# Patient Record
Sex: MALE | Race: WHITE | NOT HISPANIC OR LATINO | Employment: OTHER | URBAN - NONMETROPOLITAN AREA
[De-identification: names, ages, dates, MRNs, and addresses within clinical notes are randomized per-mention and may not be internally consistent; named-entity substitution may affect disease eponyms.]

---

## 2021-07-24 ENCOUNTER — HOSPITAL ENCOUNTER (OUTPATIENT)
Facility: HOSPITAL | Age: 86
Discharge: HOME | End: 2021-08-07
Attending: INTERNAL MEDICINE | Admitting: INTERNAL MEDICINE

## 2021-07-24 RX ORDER — BISACODYL 10 MG
10 SUPPOSITORY, RECTAL RECTAL NIGHTLY PRN
Status: DISCONTINUED | OUTPATIENT
Start: 2021-07-24 | End: 2021-08-07 | Stop reason: HOSPADM

## 2021-07-24 RX ORDER — ONDANSETRON 2 MG/ML
4 INJECTION INTRAMUSCULAR; INTRAVENOUS EVERY 6 HOURS PRN
Status: DISCONTINUED | OUTPATIENT
Start: 2021-07-24 | End: 2021-08-07 | Stop reason: HOSPADM

## 2021-07-24 RX ORDER — ACETAMINOPHEN 325 MG/1
650 TABLET ORAL EVERY 6 HOURS PRN
Status: DISCONTINUED | OUTPATIENT
Start: 2021-07-24 | End: 2021-08-07 | Stop reason: HOSPADM

## 2021-07-24 RX ORDER — SACCHAROMYCES BOULARDII 250 MG
500 CAPSULE ORAL 2 TIMES DAILY
Status: DISCONTINUED | OUTPATIENT
Start: 2021-07-25 | End: 2021-08-07 | Stop reason: HOSPADM

## 2021-07-24 RX ORDER — FUROSEMIDE 10 MG/ML
40 INJECTION INTRAMUSCULAR; INTRAVENOUS EVERY 8 HOURS SCHEDULED
Status: DISCONTINUED | OUTPATIENT
Start: 2021-07-25 | End: 2021-07-25

## 2021-07-24 RX ORDER — ALLOPURINOL 100 MG/1
100 TABLET ORAL DAILY
Status: DISCONTINUED | OUTPATIENT
Start: 2021-07-25 | End: 2021-08-07 | Stop reason: HOSPADM

## 2021-07-24 RX ORDER — SPIRONOLACTONE 25 MG/1
25 TABLET ORAL DAILY
Status: DISCONTINUED | OUTPATIENT
Start: 2021-07-25 | End: 2021-08-06

## 2021-07-24 RX ORDER — HYDROCODONE BITARTRATE AND ACETAMINOPHEN 7.5; 325 MG/1; MG/1
1 TABLET ORAL NIGHTLY PRN
Status: DISCONTINUED | OUTPATIENT
Start: 2021-07-24 | End: 2021-07-25

## 2021-07-24 RX ORDER — FINASTERIDE 5 MG/1
5 TABLET, FILM COATED ORAL DAILY
Status: DISCONTINUED | OUTPATIENT
Start: 2021-07-25 | End: 2021-08-07 | Stop reason: HOSPADM

## 2021-07-24 RX ORDER — ALUMINA, MAGNESIA, AND SIMETHICONE 2400; 2400; 240 MG/30ML; MG/30ML; MG/30ML
30 SUSPENSION ORAL EVERY 4 HOURS PRN
Status: DISCONTINUED | OUTPATIENT
Start: 2021-07-24 | End: 2021-08-07 | Stop reason: HOSPADM

## 2021-07-24 RX ORDER — ATORVASTATIN CALCIUM 10 MG/1
10 TABLET, FILM COATED ORAL NIGHTLY
Status: DISCONTINUED | OUTPATIENT
Start: 2021-07-25 | End: 2021-08-07 | Stop reason: HOSPADM

## 2021-07-24 RX ORDER — CARVEDILOL 3.12 MG/1
12.5 TABLET ORAL 2 TIMES DAILY WITH MEALS
Status: DISCONTINUED | OUTPATIENT
Start: 2021-07-25 | End: 2021-08-07 | Stop reason: HOSPADM

## 2021-07-24 RX ORDER — ASPIRIN 81 MG/1
81 TABLET, CHEWABLE ORAL DAILY
Status: DISCONTINUED | OUTPATIENT
Start: 2021-07-25 | End: 2021-08-07 | Stop reason: HOSPADM

## 2021-07-25 LAB
ALBUMIN SERPL-MCNC: 3.2 G/DL (ref 3.5–5.2)
ALBUMIN/GLOB SERPL: 1.3 G/DL
ALP SERPL-CCNC: 87 U/L (ref 39–117)
ALT SERPL W P-5'-P-CCNC: 27 U/L (ref 1–41)
ANION GAP SERPL CALCULATED.3IONS-SCNC: 11 MMOL/L (ref 5–15)
ANISOCYTOSIS BLD QL: ABNORMAL
AST SERPL-CCNC: 31 U/L (ref 1–40)
BACTERIA UR QL AUTO: ABNORMAL /HPF
BASOPHILS # BLD MANUAL: 0.27 10*3/MM3 (ref 0–0.2)
BASOPHILS NFR BLD AUTO: 2 % (ref 0–1.5)
BILIRUB SERPL-MCNC: 0.5 MG/DL (ref 0–1.2)
BILIRUB UR QL STRIP: NEGATIVE
BUN SERPL-MCNC: 51 MG/DL (ref 8–23)
BUN/CREAT SERPL: 32.5 (ref 7–25)
CALCIUM SPEC-SCNC: 8.8 MG/DL (ref 8.2–9.6)
CHLORIDE SERPL-SCNC: 100 MMOL/L (ref 98–107)
CLARITY UR: CLEAR
CLUMPED PLATELETS: PRESENT
CO2 SERPL-SCNC: 27 MMOL/L (ref 22–29)
COLOR UR: YELLOW
CREAT SERPL-MCNC: 1.57 MG/DL (ref 0.76–1.27)
CREAT UR-MCNC: 33.2 MG/DL
DEPRECATED RDW RBC AUTO: 53.1 FL (ref 37–54)
EOSINOPHIL # BLD MANUAL: 0.54 10*3/MM3 (ref 0–0.4)
EOSINOPHIL NFR BLD MANUAL: 4.1 % (ref 0.3–6.2)
ERYTHROCYTE [DISTWIDTH] IN BLOOD BY AUTOMATED COUNT: 15.4 % (ref 12.3–15.4)
GFR SERPL CREATININE-BSD FRML MDRD: 42 ML/MIN/1.73
GLOBULIN UR ELPH-MCNC: 2.5 GM/DL
GLUCOSE SERPL-MCNC: 139 MG/DL (ref 65–99)
GLUCOSE UR STRIP-MCNC: NEGATIVE MG/DL
HCT VFR BLD AUTO: 34.1 % (ref 37.5–51)
HGB BLD-MCNC: 11.5 G/DL (ref 13–17.7)
HGB UR QL STRIP.AUTO: ABNORMAL
HYALINE CASTS UR QL AUTO: ABNORMAL /LPF
KETONES UR QL STRIP: NEGATIVE
LEUKOCYTE ESTERASE UR QL STRIP.AUTO: ABNORMAL
LYMPHOCYTES # BLD MANUAL: 2.03 10*3/MM3 (ref 0.7–3.1)
LYMPHOCYTES NFR BLD MANUAL: 15.3 % (ref 19.6–45.3)
LYMPHOCYTES NFR BLD MANUAL: 9.2 % (ref 5–12)
MACROCYTES BLD QL SMEAR: ABNORMAL
MCH RBC QN AUTO: 31.9 PG (ref 26.6–33)
MCHC RBC AUTO-ENTMCNC: 33.7 G/DL (ref 31.5–35.7)
MCV RBC AUTO: 94.5 FL (ref 79–97)
MONOCYTES # BLD AUTO: 1.22 10*3/MM3 (ref 0.1–0.9)
MYELOCYTES NFR BLD MANUAL: 2 % (ref 0–0)
NEUTROPHILS # BLD AUTO: 8.92 10*3/MM3 (ref 1.7–7)
NEUTROPHILS NFR BLD MANUAL: 66.3 % (ref 42.7–76)
NEUTS BAND NFR BLD MANUAL: 1 % (ref 0–5)
NITRITE UR QL STRIP: NEGATIVE
PH UR STRIP.AUTO: 6 [PH] (ref 5–8)
PLATELET # BLD AUTO: 277 10*3/MM3 (ref 140–450)
PMV BLD AUTO: 9.3 FL (ref 6–12)
POLYCHROMASIA BLD QL SMEAR: ABNORMAL
POTASSIUM SERPL-SCNC: 4.6 MMOL/L (ref 3.5–5.2)
PREALB SERPL-MCNC: 18 MG/DL (ref 20–40)
PROT SERPL-MCNC: 5.7 G/DL (ref 6–8.5)
PROT UR QL STRIP: ABNORMAL
RBC # BLD AUTO: 3.61 10*6/MM3 (ref 4.14–5.8)
RBC # UR: ABNORMAL /HPF
REF LAB TEST METHOD: ABNORMAL
SODIUM SERPL-SCNC: 138 MMOL/L (ref 136–145)
SODIUM UR-SCNC: 74 MMOL/L
SP GR UR STRIP: 1.01 (ref 1–1.03)
SQUAMOUS #/AREA URNS HPF: ABNORMAL /HPF
UROBILINOGEN UR QL STRIP: ABNORMAL
WBC # BLD AUTO: 13.25 10*3/MM3 (ref 3.4–10.8)
WBC MORPH BLD: NORMAL
WBC UR QL AUTO: ABNORMAL /HPF

## 2021-07-25 PROCEDURE — 80053 COMPREHEN METABOLIC PANEL: CPT | Performed by: INTERNAL MEDICINE

## 2021-07-25 PROCEDURE — 25010000002 FUROSEMIDE PER 20 MG: Performed by: INTERNAL MEDICINE

## 2021-07-25 PROCEDURE — 82570 ASSAY OF URINE CREATININE: CPT | Performed by: INTERNAL MEDICINE

## 2021-07-25 PROCEDURE — 87086 URINE CULTURE/COLONY COUNT: CPT | Performed by: INTERNAL MEDICINE

## 2021-07-25 PROCEDURE — 85007 BL SMEAR W/DIFF WBC COUNT: CPT | Performed by: INTERNAL MEDICINE

## 2021-07-25 PROCEDURE — 81001 URINALYSIS AUTO W/SCOPE: CPT | Performed by: INTERNAL MEDICINE

## 2021-07-25 PROCEDURE — 85025 COMPLETE CBC W/AUTO DIFF WBC: CPT | Performed by: INTERNAL MEDICINE

## 2021-07-25 PROCEDURE — 92610 EVALUATE SWALLOWING FUNCTION: CPT

## 2021-07-25 PROCEDURE — 84134 ASSAY OF PREALBUMIN: CPT | Performed by: INTERNAL MEDICINE

## 2021-07-25 PROCEDURE — 84300 ASSAY OF URINE SODIUM: CPT | Performed by: INTERNAL MEDICINE

## 2021-07-25 PROCEDURE — 25010000002 ENOXAPARIN PER 10 MG: Performed by: NURSE PRACTITIONER

## 2021-07-25 RX ORDER — HYDROCODONE BITARTRATE AND ACETAMINOPHEN 7.5; 325 MG/1; MG/1
1 TABLET ORAL EVERY 4 HOURS PRN
Status: DISCONTINUED | OUTPATIENT
Start: 2021-07-25 | End: 2021-08-07 | Stop reason: HOSPADM

## 2021-07-26 LAB
ALBUMIN SERPL-MCNC: 3.3 G/DL (ref 3.5–5.2)
ALBUMIN/GLOB SERPL: 1 G/DL
ALP SERPL-CCNC: 88 U/L (ref 39–117)
ALT SERPL W P-5'-P-CCNC: 31 U/L (ref 1–41)
ANION GAP SERPL CALCULATED.3IONS-SCNC: 14 MMOL/L (ref 5–15)
AST SERPL-CCNC: 45 U/L (ref 1–40)
BASOPHILS # BLD AUTO: 0.11 10*3/MM3 (ref 0–0.2)
BASOPHILS NFR BLD AUTO: 0.9 % (ref 0–1.5)
BILIRUB SERPL-MCNC: 0.4 MG/DL (ref 0–1.2)
BUN SERPL-MCNC: 49 MG/DL (ref 8–23)
BUN/CREAT SERPL: 27.4 (ref 7–25)
CALCIUM SPEC-SCNC: 9.1 MG/DL (ref 8.2–9.6)
CHLORIDE SERPL-SCNC: 97 MMOL/L (ref 98–107)
CO2 SERPL-SCNC: 27 MMOL/L (ref 22–29)
CREAT SERPL-MCNC: 1.79 MG/DL (ref 0.76–1.27)
DEPRECATED RDW RBC AUTO: 54.6 FL (ref 37–54)
EOSINOPHIL # BLD AUTO: 0.43 10*3/MM3 (ref 0–0.4)
EOSINOPHIL NFR BLD AUTO: 3.7 % (ref 0.3–6.2)
ERYTHROCYTE [DISTWIDTH] IN BLOOD BY AUTOMATED COUNT: 15.5 % (ref 12.3–15.4)
GFR SERPL CREATININE-BSD FRML MDRD: 36 ML/MIN/1.73
GLOBULIN UR ELPH-MCNC: 3.4 GM/DL
GLUCOSE SERPL-MCNC: 127 MG/DL (ref 65–99)
HCT VFR BLD AUTO: 37.8 % (ref 37.5–51)
HGB BLD-MCNC: 11.9 G/DL (ref 13–17.7)
IMM GRANULOCYTES # BLD AUTO: 0.36 10*3/MM3 (ref 0–0.05)
IMM GRANULOCYTES NFR BLD AUTO: 3.1 % (ref 0–0.5)
LYMPHOCYTES # BLD AUTO: 1.55 10*3/MM3 (ref 0.7–3.1)
LYMPHOCYTES NFR BLD AUTO: 13.2 % (ref 19.6–45.3)
MCH RBC QN AUTO: 30.4 PG (ref 26.6–33)
MCHC RBC AUTO-ENTMCNC: 31.5 G/DL (ref 31.5–35.7)
MCV RBC AUTO: 96.4 FL (ref 79–97)
MONOCYTES # BLD AUTO: 1.13 10*3/MM3 (ref 0.1–0.9)
MONOCYTES NFR BLD AUTO: 9.6 % (ref 5–12)
NEUTROPHILS NFR BLD AUTO: 69.5 % (ref 42.7–76)
NEUTROPHILS NFR BLD AUTO: 8.19 10*3/MM3 (ref 1.7–7)
NRBC BLD AUTO-RTO: 0 /100 WBC (ref 0–0.2)
NT-PROBNP SERPL-MCNC: 338.6 PG/ML (ref 0–1800)
PLATELET # BLD AUTO: 259 10*3/MM3 (ref 140–450)
PMV BLD AUTO: 8.7 FL (ref 6–12)
POTASSIUM SERPL-SCNC: 4.8 MMOL/L (ref 3.5–5.2)
PROT SERPL-MCNC: 6.7 G/DL (ref 6–8.5)
RBC # BLD AUTO: 3.92 10*6/MM3 (ref 4.14–5.8)
SODIUM SERPL-SCNC: 138 MMOL/L (ref 136–145)
VANCOMYCIN TROUGH SERPL-MCNC: 17.7 MCG/ML (ref 5–20)
WBC # BLD AUTO: 11.77 10*3/MM3 (ref 3.4–10.8)

## 2021-07-26 PROCEDURE — 87070 CULTURE OTHR SPECIMN AEROBIC: CPT | Performed by: INTERNAL MEDICINE

## 2021-07-26 PROCEDURE — 83880 ASSAY OF NATRIURETIC PEPTIDE: CPT | Performed by: INTERNAL MEDICINE

## 2021-07-26 PROCEDURE — 87186 SC STD MICRODIL/AGAR DIL: CPT | Performed by: INTERNAL MEDICINE

## 2021-07-26 PROCEDURE — 87077 CULTURE AEROBIC IDENTIFY: CPT | Performed by: INTERNAL MEDICINE

## 2021-07-26 PROCEDURE — 80053 COMPREHEN METABOLIC PANEL: CPT | Performed by: INTERNAL MEDICINE

## 2021-07-26 PROCEDURE — 25010000002 ENOXAPARIN PER 10 MG: Performed by: NURSE PRACTITIONER

## 2021-07-26 PROCEDURE — 80202 ASSAY OF VANCOMYCIN: CPT | Performed by: INTERNAL MEDICINE

## 2021-07-26 PROCEDURE — 25010000002 FUROSEMIDE PER 20 MG: Performed by: INTERNAL MEDICINE

## 2021-07-26 PROCEDURE — 97167 OT EVAL HIGH COMPLEX 60 MIN: CPT | Performed by: OCCUPATIONAL THERAPIST

## 2021-07-26 PROCEDURE — 97162 PT EVAL MOD COMPLEX 30 MIN: CPT

## 2021-07-26 PROCEDURE — 85025 COMPLETE CBC W/AUTO DIFF WBC: CPT | Performed by: INTERNAL MEDICINE

## 2021-07-26 PROCEDURE — 87205 SMEAR GRAM STAIN: CPT | Performed by: INTERNAL MEDICINE

## 2021-07-27 LAB
ANION GAP SERPL CALCULATED.3IONS-SCNC: 12 MMOL/L (ref 5–15)
BACTERIA SPEC AEROBE CULT: NO GROWTH
BASOPHILS # BLD AUTO: 0.12 10*3/MM3 (ref 0–0.2)
BASOPHILS NFR BLD AUTO: 1 % (ref 0–1.5)
BUN SERPL-MCNC: 49 MG/DL (ref 8–23)
BUN/CREAT SERPL: 26.9 (ref 7–25)
CALCIUM SPEC-SCNC: 9.1 MG/DL (ref 8.2–9.6)
CHLORIDE SERPL-SCNC: 99 MMOL/L (ref 98–107)
CO2 SERPL-SCNC: 28 MMOL/L (ref 22–29)
CREAT SERPL-MCNC: 1.82 MG/DL (ref 0.76–1.27)
DEPRECATED RDW RBC AUTO: 52.8 FL (ref 37–54)
EOSINOPHIL # BLD AUTO: 0.49 10*3/MM3 (ref 0–0.4)
EOSINOPHIL NFR BLD AUTO: 4.2 % (ref 0.3–6.2)
ERYTHROCYTE [DISTWIDTH] IN BLOOD BY AUTOMATED COUNT: 15.3 % (ref 12.3–15.4)
GFR SERPL CREATININE-BSD FRML MDRD: 35 ML/MIN/1.73
GLUCOSE SERPL-MCNC: 134 MG/DL (ref 65–99)
HCT VFR BLD AUTO: 35.7 % (ref 37.5–51)
HGB BLD-MCNC: 11.5 G/DL (ref 13–17.7)
IMM GRANULOCYTES # BLD AUTO: 0.36 10*3/MM3 (ref 0–0.05)
IMM GRANULOCYTES NFR BLD AUTO: 3.1 % (ref 0–0.5)
LYMPHOCYTES # BLD AUTO: 1.25 10*3/MM3 (ref 0.7–3.1)
LYMPHOCYTES NFR BLD AUTO: 10.8 % (ref 19.6–45.3)
MAGNESIUM SERPL-MCNC: 2.6 MG/DL (ref 1.6–2.4)
MCH RBC QN AUTO: 30.3 PG (ref 26.6–33)
MCHC RBC AUTO-ENTMCNC: 32.2 G/DL (ref 31.5–35.7)
MCV RBC AUTO: 93.9 FL (ref 79–97)
MONOCYTES # BLD AUTO: 0.95 10*3/MM3 (ref 0.1–0.9)
MONOCYTES NFR BLD AUTO: 8.2 % (ref 5–12)
NEUTROPHILS NFR BLD AUTO: 72.7 % (ref 42.7–76)
NEUTROPHILS NFR BLD AUTO: 8.39 10*3/MM3 (ref 1.7–7)
NRBC BLD AUTO-RTO: 0 /100 WBC (ref 0–0.2)
PLATELET # BLD AUTO: 279 10*3/MM3 (ref 140–450)
PMV BLD AUTO: 8.7 FL (ref 6–12)
POTASSIUM SERPL-SCNC: 4.4 MMOL/L (ref 3.5–5.2)
RBC # BLD AUTO: 3.8 10*6/MM3 (ref 4.14–5.8)
SODIUM SERPL-SCNC: 139 MMOL/L (ref 136–145)
WBC # BLD AUTO: 11.56 10*3/MM3 (ref 3.4–10.8)

## 2021-07-27 PROCEDURE — 25010000002 VANCOMYCIN: Performed by: INTERNAL MEDICINE

## 2021-07-27 PROCEDURE — 25010000002 FUROSEMIDE PER 20 MG: Performed by: INTERNAL MEDICINE

## 2021-07-27 PROCEDURE — 85025 COMPLETE CBC W/AUTO DIFF WBC: CPT | Performed by: INTERNAL MEDICINE

## 2021-07-27 PROCEDURE — 80048 BASIC METABOLIC PNL TOTAL CA: CPT | Performed by: INTERNAL MEDICINE

## 2021-07-27 PROCEDURE — 83735 ASSAY OF MAGNESIUM: CPT | Performed by: INTERNAL MEDICINE

## 2021-07-27 PROCEDURE — 25010000002 ENOXAPARIN PER 10 MG: Performed by: NURSE PRACTITIONER

## 2021-07-28 LAB
ANION GAP SERPL CALCULATED.3IONS-SCNC: 11 MMOL/L (ref 5–15)
BUN SERPL-MCNC: 52 MG/DL (ref 8–23)
BUN/CREAT SERPL: 26.5 (ref 7–25)
CALCIUM SPEC-SCNC: 8.9 MG/DL (ref 8.2–9.6)
CHLORIDE SERPL-SCNC: 97 MMOL/L (ref 98–107)
CO2 SERPL-SCNC: 30 MMOL/L (ref 22–29)
CREAT SERPL-MCNC: 1.96 MG/DL (ref 0.76–1.27)
GFR SERPL CREATININE-BSD FRML MDRD: 32 ML/MIN/1.73
GLUCOSE SERPL-MCNC: 128 MG/DL (ref 65–99)
MAGNESIUM SERPL-MCNC: 2.6 MG/DL (ref 1.6–2.4)
POTASSIUM SERPL-SCNC: 3.9 MMOL/L (ref 3.5–5.2)
SODIUM SERPL-SCNC: 138 MMOL/L (ref 136–145)
WHOLE BLOOD HOLD SPECIMEN: NORMAL

## 2021-07-28 PROCEDURE — 83735 ASSAY OF MAGNESIUM: CPT | Performed by: INTERNAL MEDICINE

## 2021-07-28 PROCEDURE — 25010000002 FUROSEMIDE PER 20 MG: Performed by: INTERNAL MEDICINE

## 2021-07-28 PROCEDURE — 25010000002 ENOXAPARIN PER 10 MG: Performed by: NURSE PRACTITIONER

## 2021-07-28 PROCEDURE — 80048 BASIC METABOLIC PNL TOTAL CA: CPT | Performed by: INTERNAL MEDICINE

## 2021-07-28 PROCEDURE — 92526 ORAL FUNCTION THERAPY: CPT

## 2021-07-29 LAB
ANION GAP SERPL CALCULATED.3IONS-SCNC: 12 MMOL/L (ref 5–15)
BACTERIA SPEC AEROBE CULT: ABNORMAL
BASOPHILS # BLD AUTO: 0.11 10*3/MM3 (ref 0–0.2)
BASOPHILS NFR BLD AUTO: 0.9 % (ref 0–1.5)
BUN SERPL-MCNC: 49 MG/DL (ref 8–23)
BUN/CREAT SERPL: 26.2 (ref 7–25)
CALCIUM SPEC-SCNC: 9.1 MG/DL (ref 8.2–9.6)
CHLORIDE SERPL-SCNC: 98 MMOL/L (ref 98–107)
CO2 SERPL-SCNC: 29 MMOL/L (ref 22–29)
CREAT SERPL-MCNC: 1.87 MG/DL (ref 0.76–1.27)
CRP SERPL-MCNC: 1.15 MG/DL (ref 0–0.5)
DEPRECATED RDW RBC AUTO: 53.9 FL (ref 37–54)
EOSINOPHIL # BLD AUTO: 0.47 10*3/MM3 (ref 0–0.4)
EOSINOPHIL NFR BLD AUTO: 3.8 % (ref 0.3–6.2)
ERYTHROCYTE [DISTWIDTH] IN BLOOD BY AUTOMATED COUNT: 15.5 % (ref 12.3–15.4)
ERYTHROCYTE [SEDIMENTATION RATE] IN BLOOD: 50 MM/HR (ref 0–15)
GFR SERPL CREATININE-BSD FRML MDRD: 34 ML/MIN/1.73
GLUCOSE SERPL-MCNC: 134 MG/DL (ref 65–99)
GRAM STN SPEC: ABNORMAL
HCT VFR BLD AUTO: 36.6 % (ref 37.5–51)
HGB BLD-MCNC: 11.8 G/DL (ref 13–17.7)
IMM GRANULOCYTES # BLD AUTO: 0.51 10*3/MM3 (ref 0–0.05)
IMM GRANULOCYTES NFR BLD AUTO: 4.1 % (ref 0–0.5)
LYMPHOCYTES # BLD AUTO: 1.45 10*3/MM3 (ref 0.7–3.1)
LYMPHOCYTES NFR BLD AUTO: 11.6 % (ref 19.6–45.3)
MAGNESIUM SERPL-MCNC: 2.5 MG/DL (ref 1.6–2.4)
MCH RBC QN AUTO: 31.1 PG (ref 26.6–33)
MCHC RBC AUTO-ENTMCNC: 32.2 G/DL (ref 31.5–35.7)
MCV RBC AUTO: 96.3 FL (ref 79–97)
MONOCYTES # BLD AUTO: 1.09 10*3/MM3 (ref 0.1–0.9)
MONOCYTES NFR BLD AUTO: 8.7 % (ref 5–12)
NEUTROPHILS NFR BLD AUTO: 70.9 % (ref 42.7–76)
NEUTROPHILS NFR BLD AUTO: 8.89 10*3/MM3 (ref 1.7–7)
NRBC BLD AUTO-RTO: 0 /100 WBC (ref 0–0.2)
NT-PROBNP SERPL-MCNC: 235.8 PG/ML (ref 0–1800)
PLATELET # BLD AUTO: 271 10*3/MM3 (ref 140–450)
PMV BLD AUTO: 9 FL (ref 6–12)
POTASSIUM SERPL-SCNC: 3.7 MMOL/L (ref 3.5–5.2)
RBC # BLD AUTO: 3.8 10*6/MM3 (ref 4.14–5.8)
SODIUM SERPL-SCNC: 139 MMOL/L (ref 136–145)
WBC # BLD AUTO: 12.52 10*3/MM3 (ref 3.4–10.8)

## 2021-07-29 PROCEDURE — 92526 ORAL FUNCTION THERAPY: CPT

## 2021-07-29 PROCEDURE — 25010000002 ENOXAPARIN PER 10 MG: Performed by: NURSE PRACTITIONER

## 2021-07-29 PROCEDURE — 85651 RBC SED RATE NONAUTOMATED: CPT | Performed by: INTERNAL MEDICINE

## 2021-07-29 PROCEDURE — 85025 COMPLETE CBC W/AUTO DIFF WBC: CPT | Performed by: INTERNAL MEDICINE

## 2021-07-29 PROCEDURE — 83735 ASSAY OF MAGNESIUM: CPT | Performed by: INTERNAL MEDICINE

## 2021-07-29 PROCEDURE — 80048 BASIC METABOLIC PNL TOTAL CA: CPT | Performed by: INTERNAL MEDICINE

## 2021-07-29 PROCEDURE — 25010000002 FUROSEMIDE PER 20 MG: Performed by: INTERNAL MEDICINE

## 2021-07-29 PROCEDURE — 86140 C-REACTIVE PROTEIN: CPT | Performed by: INTERNAL MEDICINE

## 2021-07-29 PROCEDURE — 83880 ASSAY OF NATRIURETIC PEPTIDE: CPT | Performed by: INTERNAL MEDICINE

## 2021-07-30 LAB
ANION GAP SERPL CALCULATED.3IONS-SCNC: 11 MMOL/L (ref 5–15)
BUN SERPL-MCNC: 52 MG/DL (ref 8–23)
BUN/CREAT SERPL: 26.3 (ref 7–25)
CALCIUM SPEC-SCNC: 9.1 MG/DL (ref 8.2–9.6)
CHLORIDE SERPL-SCNC: 96 MMOL/L (ref 98–107)
CO2 SERPL-SCNC: 30 MMOL/L (ref 22–29)
CREAT SERPL-MCNC: 1.98 MG/DL (ref 0.76–1.27)
GFR SERPL CREATININE-BSD FRML MDRD: 32 ML/MIN/1.73
GLUCOSE SERPL-MCNC: 161 MG/DL (ref 65–99)
MAGNESIUM SERPL-MCNC: 2.6 MG/DL (ref 1.6–2.4)
POTASSIUM SERPL-SCNC: 3.6 MMOL/L (ref 3.5–5.2)
SODIUM SERPL-SCNC: 137 MMOL/L (ref 136–145)

## 2021-07-30 PROCEDURE — 83735 ASSAY OF MAGNESIUM: CPT | Performed by: INTERNAL MEDICINE

## 2021-07-30 PROCEDURE — 80048 BASIC METABOLIC PNL TOTAL CA: CPT | Performed by: INTERNAL MEDICINE

## 2021-07-30 PROCEDURE — 25010000002 ENOXAPARIN PER 10 MG: Performed by: NURSE PRACTITIONER

## 2021-07-30 PROCEDURE — 25010000002 FUROSEMIDE PER 20 MG: Performed by: INTERNAL MEDICINE

## 2021-07-31 LAB
ANION GAP SERPL CALCULATED.3IONS-SCNC: 13 MMOL/L (ref 5–15)
BUN SERPL-MCNC: 50 MG/DL (ref 8–23)
BUN/CREAT SERPL: 24.9 (ref 7–25)
CALCIUM SPEC-SCNC: 9 MG/DL (ref 8.2–9.6)
CHLORIDE SERPL-SCNC: 96 MMOL/L (ref 98–107)
CO2 SERPL-SCNC: 29 MMOL/L (ref 22–29)
CREAT SERPL-MCNC: 2.01 MG/DL (ref 0.76–1.27)
GFR SERPL CREATININE-BSD FRML MDRD: 31 ML/MIN/1.73
GLUCOSE SERPL-MCNC: 157 MG/DL (ref 65–99)
MAGNESIUM SERPL-MCNC: 2.6 MG/DL (ref 1.6–2.4)
POTASSIUM SERPL-SCNC: 3.7 MMOL/L (ref 3.5–5.2)
SODIUM SERPL-SCNC: 138 MMOL/L (ref 136–145)

## 2021-07-31 PROCEDURE — 25010000002 FUROSEMIDE PER 20 MG: Performed by: INTERNAL MEDICINE

## 2021-07-31 PROCEDURE — 80048 BASIC METABOLIC PNL TOTAL CA: CPT | Performed by: INTERNAL MEDICINE

## 2021-07-31 PROCEDURE — 25010000002 ENOXAPARIN PER 10 MG: Performed by: NURSE PRACTITIONER

## 2021-07-31 PROCEDURE — 83735 ASSAY OF MAGNESIUM: CPT | Performed by: INTERNAL MEDICINE

## 2021-08-01 LAB
ANION GAP SERPL CALCULATED.3IONS-SCNC: 12 MMOL/L (ref 5–15)
BUN SERPL-MCNC: 52 MG/DL (ref 8–23)
BUN/CREAT SERPL: 25.9 (ref 7–25)
CALCIUM SPEC-SCNC: 9.3 MG/DL (ref 8.2–9.6)
CHLORIDE SERPL-SCNC: 96 MMOL/L (ref 98–107)
CO2 SERPL-SCNC: 29 MMOL/L (ref 22–29)
CREAT SERPL-MCNC: 2.01 MG/DL (ref 0.76–1.27)
GFR SERPL CREATININE-BSD FRML MDRD: 31 ML/MIN/1.73
GLUCOSE SERPL-MCNC: 144 MG/DL (ref 65–99)
MAGNESIUM SERPL-MCNC: 2.5 MG/DL (ref 1.6–2.4)
POTASSIUM SERPL-SCNC: 4 MMOL/L (ref 3.5–5.2)
SODIUM SERPL-SCNC: 137 MMOL/L (ref 136–145)

## 2021-08-01 PROCEDURE — 25010000002 FUROSEMIDE PER 20 MG: Performed by: INTERNAL MEDICINE

## 2021-08-01 PROCEDURE — 25010000002 ENOXAPARIN PER 10 MG: Performed by: NURSE PRACTITIONER

## 2021-08-01 PROCEDURE — 83735 ASSAY OF MAGNESIUM: CPT | Performed by: INTERNAL MEDICINE

## 2021-08-01 PROCEDURE — 80048 BASIC METABOLIC PNL TOTAL CA: CPT | Performed by: INTERNAL MEDICINE

## 2021-08-01 NOTE — PROGRESS NOTES
Nephrology ([facility]) Progress Note      Patient:  Nabeel Juarez  YOB: 1931  Date of Service: 7/31/2021  MRN: [id number]   Acct: [id number]   Primary Care Physician: Provider, No Known  Advance Directive:   There are no questions and answers to display.     Admit Date: 7/24/2021       Hospital Day: 0  Referring Provider: Chance Wiggins MD      Patient personally seen and examined.  Complete chart including Consults, Notes, Operative Reports, Labs, Cardiology, and Radiology studies reviewed as able.        Subjective:  Nabeel Juarez is a 89 y.o. male  whom we were consulted for chronic kidney disease.  Patient is a transfer from [facility] after a hospital admission for fluid overload and cellulitis of the lower extremities.  Patient has a history of lymphedema of the lower extremities.  He has chronic venous stasis ulcerations and was also having cellulitic changes with drainage.  He was managed with diuretics and antibiotics.  He was transferred to [facility] to continue diuretics and physical therapy.  His medical history is positive for congestive heart failure, chronic kidney disease stage IIIb, recurrent urine tract infection, hypertension and lymphedema of the lower extremities.  He has seen nephrology in the past.     Today, no new events.  He denies dysuria, cp/n/v.  Up in chair with feet hanging down again in similar fashion to previous days.  No family present.      Temp- 97.7  Pulse- 79  Resp- 22  BP- 104/50  O2%- 96         Allergies:  Penicillins    Home Meds:  No medications prior to admission.       Medicines:  Current Facility-Administered Medications   Medication Dose Route Frequency Provider Last Rate Last Admin   • acetaminophen (TYLENOL) tablet 650 mg  650 mg Oral Q6H PRN Chance Wiggins MD       • allopurinol (ZYLOPRIM) tablet 100 mg  100 mg Oral Daily Chance Wiggins MD       • aluminum-magnesium  hydroxide-simethicone (MAALOX MAX) 400-400-40 MG/5ML suspension 30 mL  30 mL Oral Q4H PRN Chance Wiggins MD       • aspirin chewable tablet 81 mg  81 mg Oral Daily Chance Wiggins MD       • atorvastatin (LIPITOR) tablet 10 mg  10 mg Oral Nightly Chance Wiggins MD       • bisacodyl (DULCOLAX) suppository 10 mg  10 mg Rectal Nightly PRN Chance Wiggins MD       • carvedilol (COREG) tablet 12.5 mg  12.5 mg Oral BID With Meals Chance Wiggins MD       • cefepime (MAXIPIME) 1 g/100 mL 0.9% NS IVPB (mbp)  1 g Intravenous Q12H Chance Wiggins MD       • enoxaparin (LOVENOX) syringe 30 mg  30 mg Subcutaneous Q24H Rachael Chambers APRN       • finasteride (PROSCAR) tablet 5 mg  5 mg Oral Daily Chance Wiggins MD       • furosemide (LASIX) 100 mg in sodium chloride 0.9 % 100 mL infusion  10 mg/hr Intravenous Continuous Alvino Miller MD       • HYDROcodone-acetaminophen (NORCO) 7.5-325 MG per tablet 1 tablet  1 tablet Oral Q4H PRN Chance Wiggins MD       • magnesium hydroxide (MILK OF MAGNESIA) suspension 2400 mg/10mL 10 mL  10 mL Oral Daily PRN Chance Wiggins MD       • ondansetron (ZOFRAN) injection 4 mg  4 mg Intravenous Q6H PRN Chance Wiggins MD       • saccharomyces boulardii (FLORASTOR) capsule 500 mg  500 mg Oral BID Chance Wiggins MD       • spironolactone (ALDACTONE) tablet 25 mg  25 mg Oral Daily Chance Wiggins MD           Past Medical History:  No past medical history on file.    Past Surgical History:  No past surgical history on file.    Family History  No family history on file.    Social History  Social History     Socioeconomic History   • Marital status:      Spouse name: Not on file   • Number of children: Not on file   • Years of education: Not on file   • Highest education level: Not on file         Review of Systems:  History obtained from chart review and the patient  General ROS: No fever or  chills  Respiratory ROS: No cough, shortness of breath, wheezing  Cardiovascular ROS: No chest pain or palpitations  Gastrointestinal ROS: No abdominal pain or melena  Genito-Urinary ROS: No dysuria or hematuria    Objective:  No data found.  No intake or output data in the 24 hours ending 07/31/21 2330  General: awake/alert   Chest:  clear to auscultation bilaterally without respiratory distress  CVS: regular rate and rhythm  Abdominal: soft, nontender, positive bowel sounds  Extremities: ble edema  Skin: warm/dry      Labs:  Results from last 7 days   Lab Units 07/29/21  0501 07/27/21  0509 07/26/21  0535   WBC 10*3/mm3 12.52* 11.56* 11.77*   HEMOGLOBIN g/dL 11.8* 11.5* 11.9*   HEMATOCRIT % 36.6* 35.7* 37.8   PLATELETS 10*3/mm3 271 279 259         Results from last 7 days   Lab Units 07/31/21  0427 07/30/21  0420 07/29/21  0501 07/27/21  0510 07/26/21  0535 07/25/21  0245 07/25/21  0245   SODIUM mmol/L 138 137 139   < > 138   < > 138   POTASSIUM mmol/L 3.7 3.6 3.7   < > 4.8   < > 4.6   CHLORIDE mmol/L 96* 96* 98   < > 97*   < > 100   CO2 mmol/L 29.0 30.0* 29.0   < > 27.0   < > 27.0   BUN mg/dL 50* 52* 49*   < > 49*   < > 51*   CREATININE mg/dL 2.01* 1.98* 1.87*   < > 1.79*   < > 1.57*   CALCIUM mg/dL 9.0 9.1 9.1   < > 9.1   < > 8.8   BILIRUBIN mg/dL  --   --   --   --  0.4  --  0.5   ALK PHOS U/L  --   --   --   --  88  --  87   ALT (SGPT) U/L  --   --   --   --  31  --  27   AST (SGOT) U/L  --   --   --   --  45*  --  31   GLUCOSE mg/dL 157* 161* 134*   < > 127*   < > 139*    < > = values in this interval not displayed.       Radiology:   Imaging Results (Last 72 Hours)     ** No results found for the last 72 hours. **          Culture:  No results found for: BLOODCX, URINECX, WOUNDCX, MRSACX, RESPCX, STOOLCX      Assessment   CKD 3b  ble lymphedema/cellulitis  Anemia  HTN  Anemia    Plan:  Continue IV lasix continuous infusion  Monitor labs closely on this infusion as creat/co2 slowly increasing, may need to  transition in next 1-2 days but will continue for today, continues to do well  D/w pt/nursing      Herve Cruz MD  7/31/2021  23:30 CDT

## 2021-08-01 NOTE — PROGRESS NOTES
BRAULIO Garland APRN        Internal Medicine Progress Note    8/1/2021   09:06 CDT    Name:  Nabeel Juarez  MRN:    [id number]     Acct:     [id number]   Room:  [address] [place] [id number] Day: 0     Admit Date: 7/24/2021 11:14 PM  PCP: Provider, No Known    Subjective:     C/C: need for continued diuresis, wound care and antibiotic therapy    Interval History: Status:  stayed the same.  Up in chair.  No family at bedside.  Afebrile.  Strongly recommended bilateral lower extremity elevation.  Reports back pain is overall controlled.  Creatinine 2.01, stable compared to yesterday (7/31/2021.  Continues on Lasix drip per nephrology.          Review of Systems   Constitutional: Positive for malaise/fatigue. Negative for chills, decreased appetite, weight gain and weight loss.   HENT: Negative for congestion, ear discharge, hoarse voice and tinnitus.    Eyes: Negative for blurred vision, discharge, visual disturbance and visual halos.   Cardiovascular: Positive for dyspnea on exertion and leg swelling. Negative for chest pain, claudication, irregular heartbeat, orthopnea and paroxysmal nocturnal dyspnea.   Respiratory: Positive for shortness of breath. Negative for cough, sputum production and wheezing.    Endocrine: Negative for cold intolerance, heat intolerance and polyuria.   Hematologic/Lymphatic: Negative for adenopathy. Does not bruise/bleed easily.   Skin: Positive for poor wound healing. Negative for dry skin, itching and suspicious lesions.   Musculoskeletal: Negative for arthritis, back pain, falls, joint pain, muscle weakness and myalgias.   Gastrointestinal: Negative for abdominal pain, constipation, diarrhea, dysphagia and hematemesis.   Genitourinary: Negative for bladder incontinence, dysuria and frequency.   Neurological: Positive for weakness. Negative for aphonia, disturbances in coordination and dizziness.   Psychiatric/Behavioral: Negative for altered mental status,  depression, memory loss and substance abuse. The patient does not have insomnia and is not nervous/anxious.          Medications:     Allergies:   Allergies   Allergen Reactions   • Penicillins Swelling       Current Meds:   Current Facility-Administered Medications:   •  acetaminophen (TYLENOL) tablet 650 mg, 650 mg, Oral, Q6H PRN, Chance Wiggins MD  •  allopurinol (ZYLOPRIM) tablet 100 mg, 100 mg, Oral, Daily, Chance Wiggins MD  •  aluminum-magnesium hydroxide-simethicone (MAALOX MAX) 400-400-40 MG/5ML suspension 30 mL, 30 mL, Oral, Q4H PRN, Chance Wiggins MD  •  aspirin chewable tablet 81 mg, 81 mg, Oral, Daily, Chance Wiggins MD  •  atorvastatin (LIPITOR) tablet 10 mg, 10 mg, Oral, Nightly, Chance Wiggins MD  •  bisacodyl (DULCOLAX) suppository 10 mg, 10 mg, Rectal, Nightly PRN, Chance Wiggins MD  •  carvedilol (COREG) tablet 12.5 mg, 12.5 mg, Oral, BID With Meals, Chance Wiggins MD  •  enoxaparin (LOVENOX) syringe 30 mg, 30 mg, Subcutaneous, Q24H, Rachael Chambers APRN  •  finasteride (PROSCAR) tablet 5 mg, 5 mg, Oral, Daily, Chance Wiggins MD  •  furosemide (LASIX) 100 mg in sodium chloride 0.9 % 100 mL infusion, 10 mg/hr, Intravenous, Continuous, Alvino Miller MD  •  HYDROcodone-acetaminophen (NORCO) 7.5-325 MG per tablet 1 tablet, 1 tablet, Oral, Q4H PRN, Chance Wiggins MD  •  magnesium hydroxide (MILK OF MAGNESIA) suspension 2400 mg/10mL 10 mL, 10 mL, Oral, Daily PRN, Chance Wiggins MD  •  ondansetron (ZOFRAN) injection 4 mg, 4 mg, Intravenous, Q6H PRN, Chance Wiggins MD  •  saccharomyces boulardii (FLORASTOR) capsule 500 mg, 500 mg, Oral, BID, Chance Wiggins MD  •  spironolactone (ALDACTONE) tablet 25 mg, 25 mg, Oral, Daily, Chance Wiggins MD    Data:     Code Status:    There are no questions and answers to display.       No family history on file.    Social History     Socioeconomic History   •  "Marital status:      Spouse name: Not on file   • Number of children: Not on file   • Years of education: Not on file   • Highest education level: Not on file       Vitals:  Ht 180.3 cm (71\")   Wt (!) 137 kg (302 lb 4.8 oz)   BMI 42.16 kg/m²     T 97.5 P 83 R 14 /53 Sp02 98% (room air)        I/O (24Hr):  No intake or output data in the 24 hours ending 08/01/21 0906    Labs and imaging:      Recent Results (from the past 12 hour(s))   Basic Metabolic Panel    Collection Time: 08/01/21  4:23 AM    Specimen: Blood   Result Value Ref Range    Glucose 144 (H) 65 - 99 mg/dL    BUN 52 (H) 8 - 23 mg/dL    Creatinine 2.01 (H) 0.76 - 1.27 mg/dL    Sodium 137 136 - 145 mmol/L    Potassium 4.0 3.5 - 5.2 mmol/L    Chloride 96 (L) 98 - 107 mmol/L    CO2 29.0 22.0 - 29.0 mmol/L    Calcium 9.3 8.2 - 9.6 mg/dL    eGFR Non African Amer 31 (L) >60 mL/min/1.73    BUN/Creatinine Ratio 25.9 (H) 7.0 - 25.0    Anion Gap 12.0 5.0 - 15.0 mmol/L   Magnesium    Collection Time: 08/01/21  4:23 AM    Specimen: Blood   Result Value Ref Range    Magnesium 2.5 (H) 1.6 - 2.4 mg/dL               Physical Examination:        Physical Exam  Vitals and nursing note reviewed.   Constitutional:       Appearance: He is well-developed.   HENT:      Head: Normocephalic and atraumatic.      Nose: Nose normal.   Eyes:      Pupils: Pupils are equal, round, and reactive to light.   Cardiovascular:      Rate and Rhythm: Normal rate and regular rhythm.      Heart sounds: Normal heart sounds.   Pulmonary:      Effort: Pulmonary effort is normal.      Breath sounds: Normal breath sounds.   Abdominal:      General: Bowel sounds are normal.      Palpations: Abdomen is soft.      Comments: obese   Musculoskeletal:         General: Normal range of motion.      Cervical back: Normal range of motion and neck supple.      Right lower leg: Edema present.      Left lower leg: Edema present.      Comments: Generalized weakness   Skin:     General: Skin is warm " and dry.      Comments: Scaling and flaking of skin to BLE     Neurological:      Mental Status: He is alert and oriented to person, place, and time.      Deep Tendon Reflexes: Reflexes are normal and symmetric.   Psychiatric:         Behavior: Behavior normal.           Assessment:               Plan:        1. Venous stasis ulcer  2. Acute on chronic congestive heart failure  3. Chronic kidney disease stage lll  4. Chronic lymphedema  5. Candida intertrigo  6. BHP  7. Hypertension  8. Hyperlipidemia  9. Osteoarthritis bilateral knees  10. Anemia  11. PAF  12. Morbid obesity  13. Pseudomonas wound infection    Continue current treatment. Monitor counts. Increase activity. Labs Monday. Continue diuresis per nephrology. Monitor renal function closely. Aggressive therapies as tolerated.  Continue IV antibiotics for scant growth pseudomonas from right ankle wound.       Electronically signed by PRESTON Ramesh on 8/1/2021 at 09:06 CDT

## 2021-08-02 VITALS — HEIGHT: 71 IN | BODY MASS INDEX: 42.66 KG/M2 | WEIGHT: 304.7 LBS

## 2021-08-02 LAB
ANION GAP SERPL CALCULATED.3IONS-SCNC: 17 MMOL/L (ref 5–15)
BUN SERPL-MCNC: 55 MG/DL (ref 8–23)
BUN/CREAT SERPL: 24.3 (ref 7–25)
CALCIUM SPEC-SCNC: 9.1 MG/DL (ref 8.2–9.6)
CHLORIDE SERPL-SCNC: 94 MMOL/L (ref 98–107)
CO2 SERPL-SCNC: 25 MMOL/L (ref 22–29)
CREAT SERPL-MCNC: 2.26 MG/DL (ref 0.76–1.27)
DEPRECATED RDW RBC AUTO: 53.3 FL (ref 37–54)
ERYTHROCYTE [DISTWIDTH] IN BLOOD BY AUTOMATED COUNT: 15.5 % (ref 12.3–15.4)
GFR SERPL CREATININE-BSD FRML MDRD: 27 ML/MIN/1.73
GLUCOSE SERPL-MCNC: 145 MG/DL (ref 65–99)
HCT VFR BLD AUTO: 38.7 % (ref 37.5–51)
HGB BLD-MCNC: 12.5 G/DL (ref 13–17.7)
MAGNESIUM SERPL-MCNC: 2.6 MG/DL (ref 1.6–2.4)
MCH RBC QN AUTO: 30.5 PG (ref 26.6–33)
MCHC RBC AUTO-ENTMCNC: 32.3 G/DL (ref 31.5–35.7)
MCV RBC AUTO: 94.4 FL (ref 79–97)
NT-PROBNP SERPL-MCNC: 267.2 PG/ML (ref 0–1800)
PLATELET # BLD AUTO: 275 10*3/MM3 (ref 140–450)
PMV BLD AUTO: 9.3 FL (ref 6–12)
POTASSIUM SERPL-SCNC: 3.8 MMOL/L (ref 3.5–5.2)
RBC # BLD AUTO: 4.1 10*6/MM3 (ref 4.14–5.8)
SODIUM SERPL-SCNC: 136 MMOL/L (ref 136–145)
WBC # BLD AUTO: 12.18 10*3/MM3 (ref 3.4–10.8)

## 2021-08-02 PROCEDURE — 83735 ASSAY OF MAGNESIUM: CPT | Performed by: INTERNAL MEDICINE

## 2021-08-02 PROCEDURE — 25010000002 ENOXAPARIN PER 10 MG: Performed by: NURSE PRACTITIONER

## 2021-08-02 PROCEDURE — 85027 COMPLETE CBC AUTOMATED: CPT | Performed by: INTERNAL MEDICINE

## 2021-08-02 PROCEDURE — 83880 ASSAY OF NATRIURETIC PEPTIDE: CPT | Performed by: INTERNAL MEDICINE

## 2021-08-02 PROCEDURE — 80048 BASIC METABOLIC PNL TOTAL CA: CPT | Performed by: INTERNAL MEDICINE

## 2021-08-02 RX ORDER — BUMETANIDE 1 MG/1
2 TABLET ORAL 2 TIMES DAILY
Status: DISCONTINUED | OUTPATIENT
Start: 2021-08-02 | End: 2021-08-06

## 2021-08-02 RX ORDER — METOLAZONE 2.5 MG/1
2.5 TABLET ORAL EVERY MORNING
Status: DISCONTINUED | OUTPATIENT
Start: 2021-08-02 | End: 2021-08-06

## 2021-08-02 RX ORDER — BUMETANIDE 1 MG/1
2 TABLET ORAL 2 TIMES DAILY
Status: DISCONTINUED | OUTPATIENT
Start: 2021-08-02 | End: 2021-08-02

## 2021-08-02 RX ORDER — METOLAZONE 2.5 MG/1
2.5 TABLET ORAL EVERY MORNING
Status: DISCONTINUED | OUTPATIENT
Start: 2021-08-03 | End: 2021-08-02

## 2021-08-02 NOTE — PROGRESS NOTES
Feliciano Wiggins M.D.  PRESTON Rizo        Internal Medicine Progress Note    8/2/2021   12:30 CDT    Name:  Nabeel Juarez  MRN:    [id number]     Acct:     [id number]   Room:  [address] [place] [address] Day: 0     Admit Date: 7/24/2021 11:14 PM  PCP: Provider, No Known    Subjective:     C/C: need for continued diuresis, wound care and antibiotic therapy    Interval History: Status:  stayed the same.  Up in chair.  No family at bedside.  Afebrile.  Strongly recommended bilateral lower extremity elevation.  Reports back pain is overall controlled.  Creatinine 2.01, stable compared to yesterday (7/31/2021.  IV dislodged, had lasix gtt infusing but will consult with nephrology to see if this can be changed to PO.          Review of Systems   Constitutional: Positive for malaise/fatigue. Negative for chills, decreased appetite, weight gain and weight loss.   HENT: Negative for congestion, ear discharge, hoarse voice and tinnitus.    Eyes: Negative for blurred vision, discharge, visual disturbance and visual halos.   Cardiovascular: Positive for dyspnea on exertion and leg swelling. Negative for chest pain, claudication, irregular heartbeat, orthopnea and paroxysmal nocturnal dyspnea.   Respiratory: Positive for shortness of breath. Negative for cough, sputum production and wheezing.    Endocrine: Negative for cold intolerance, heat intolerance and polyuria.   Hematologic/Lymphatic: Negative for adenopathy. Does not bruise/bleed easily.   Skin: Positive for poor wound healing. Negative for dry skin, itching and suspicious lesions.   Musculoskeletal: Negative for arthritis, back pain, falls, joint pain, muscle weakness and myalgias.   Gastrointestinal: Negative for abdominal pain, constipation, diarrhea, dysphagia and hematemesis.   Genitourinary: Negative for bladder incontinence, dysuria and frequency.   Neurological: Positive for weakness. Negative for aphonia, disturbances in coordination and dizziness.    Psychiatric/Behavioral: Negative for altered mental status, depression, memory loss and substance abuse. The patient does not have insomnia and is not nervous/anxious.          Medications:     Allergies:   Allergies   Allergen Reactions    Penicillins Swelling       Current Meds:   Current Facility-Administered Medications:     acetaminophen (TYLENOL) tablet 650 mg, 650 mg, Oral, Q6H PRN, Chance Wiggins MD    allopurinol (ZYLOPRIM) tablet 100 mg, 100 mg, Oral, Daily, Chance Wiggins MD    aluminum-magnesium hydroxide-simethicone (MAALOX MAX) 400-400-40 MG/5ML suspension 30 mL, 30 mL, Oral, Q4H PRN, Chance Wiggins MD    aspirin chewable tablet 81 mg, 81 mg, Oral, Daily, Chance Wiggins MD    atorvastatin (LIPITOR) tablet 10 mg, 10 mg, Oral, Nightly, Chance Wiggins MD    bisacodyl (DULCOLAX) suppository 10 mg, 10 mg, Rectal, Nightly PRN, Chance Wiggins MD    carvedilol (COREG) tablet 12.5 mg, 12.5 mg, Oral, BID With Meals, Chance Wiggins MD    enoxaparin (LOVENOX) syringe 30 mg, 30 mg, Subcutaneous, Q24H, Rachael Chambers APRN    finasteride (PROSCAR) tablet 5 mg, 5 mg, Oral, Daily, Chance Wiggins MD    furosemide (LASIX) 100 mg in sodium chloride 0.9 % 100 mL infusion, 10 mg/hr, Intravenous, Continuous, Alvino Miller MD    HYDROcodone-acetaminophen (NORCO) 7.5-325 MG per tablet 1 tablet, 1 tablet, Oral, Q4H PRN, Chance Wiggins MD    magnesium hydroxide (MILK OF MAGNESIA) suspension 2400 mg/10mL 10 mL, 10 mL, Oral, Daily PRN, Chance Wiggins MD    ondansetron (ZOFRAN) injection 4 mg, 4 mg, Intravenous, Q6H PRN, Chance Wiggins MD    saccharomyces boulardii (FLORASTOR) capsule 500 mg, 500 mg, Oral, BID, Chance Wiggins MD    spironolactone (ALDACTONE) tablet 25 mg, 25 mg, Oral, Daily, Chance Wiggins MD    Data:     Code Status:    There are no questions and answers to display.       No family history on  "file.    Social History     Socioeconomic History    Marital status:      Spouse name: Not on file    Number of children: Not on file    Years of education: Not on file    Highest education level: Not on file       Vitals:  Ht 180.3 cm (71\")   Wt (!) 138 kg (304 lb 11.2 oz)   BMI 42.50 kg/m²     T 97.0 P 79 R 10 /47 Sp02 99% (room air)        I/O (24Hr):  No intake or output data in the 24 hours ending 08/02/21 1230    Labs and imaging:      Recent Results (from the past 12 hour(s))   Basic Metabolic Panel    Collection Time: 08/02/21  5:25 AM    Specimen: Blood   Result Value Ref Range    Glucose 145 (H) 65 - 99 mg/dL    BUN 55 (H) 8 - 23 mg/dL    Creatinine 2.26 (H) 0.76 - 1.27 mg/dL    Sodium 136 136 - 145 mmol/L    Potassium 3.8 3.5 - 5.2 mmol/L    Chloride 94 (L) 98 - 107 mmol/L    CO2 25.0 22.0 - 29.0 mmol/L    Calcium 9.1 8.2 - 9.6 mg/dL    eGFR Non African Amer 27 (L) >60 mL/min/1.73    BUN/Creatinine Ratio 24.3 7.0 - 25.0    Anion Gap 17.0 (H) 5.0 - 15.0 mmol/L   Magnesium    Collection Time: 08/02/21  5:25 AM    Specimen: Blood   Result Value Ref Range    Magnesium 2.6 (H) 1.6 - 2.4 mg/dL   CBC (No Diff)    Collection Time: 08/02/21  5:25 AM    Specimen: Blood   Result Value Ref Range    WBC 12.18 (H) 3.40 - 10.80 10*3/mm3    RBC 4.10 (L) 4.14 - 5.80 10*6/mm3    Hemoglobin 12.5 (L) 13.0 - 17.7 g/dL    Hematocrit 38.7 37.5 - 51.0 %    MCV 94.4 79.0 - 97.0 fL    MCH 30.5 26.6 - 33.0 pg    MCHC 32.3 31.5 - 35.7 g/dL    RDW 15.5 (H) 12.3 - 15.4 %    RDW-SD 53.3 37.0 - 54.0 fl    MPV 9.3 6.0 - 12.0 fL    Platelets 275 140 - 450 10*3/mm3   BNP    Collection Time: 08/02/21  5:25 AM    Specimen: Blood   Result Value Ref Range    proBNP 267.2 0.0-1,800.0 pg/mL               Physical Examination:        Physical Exam  Vitals and nursing note reviewed.   Constitutional:       Appearance: He is well-developed.   HENT:      Head: Normocephalic and atraumatic.      Nose: Nose normal.   Eyes:      Pupils: " Pupils are equal, round, and reactive to light.   Cardiovascular:      Rate and Rhythm: Normal rate and regular rhythm.      Heart sounds: Normal heart sounds.   Pulmonary:      Effort: Pulmonary effort is normal.      Breath sounds: Normal breath sounds.   Abdominal:      General: Bowel sounds are normal.      Palpations: Abdomen is soft.      Comments: obese   Musculoskeletal:         General: Normal range of motion.      Cervical back: Normal range of motion and neck supple.      Right lower leg: Edema present.      Left lower leg: Edema present.      Comments: Generalized weakness   Skin:     General: Skin is warm and dry.      Comments: Scaling and flaking of skin to BLE     Neurological:      Mental Status: He is alert and oriented to person, place, and time.      Deep Tendon Reflexes: Reflexes are normal and symmetric.   Psychiatric:         Behavior: Behavior normal.           Assessment:               Plan:        Venous stasis ulcer  Acute on chronic congestive heart failure  Chronic kidney disease stage lll  Chronic lymphedema  Candida intertrigo  BHP  Hypertension  Hyperlipidemia  Osteoarthritis bilateral knees  Anemia  PAF  Morbid obesity  Pseudomonas wound infection    Continue current treatment. Monitor counts. Increase activity. Labs performed today. Continue diuresis per nephrology, ok to change to PO lasix due to no IV access. Monitor renal function closely. Aggressive therapies as tolerated.  Watch off antibiotics for scant growth pseudomonas from right ankle wound.       Electronically signed by PRESTON Pickering on 8/2/2021 at 12:30 CDT     I have discussed the care of Nabeel Juarez, including pertinent history and exam findings, with the nurse practitioner.    I have seen and examined the patient and the key elements of all parts of the encounter have been performed by me.  I agree with the assessment, plan and orders as documented by Katherine JOHNSTON, after I modified the exam findings  and the plan of treatments and the final version is my approved version of the assessment.        Electronically signed by Chance Wiggins MD on 8/2/2021 at 20:54 CDT

## 2021-08-02 NOTE — PROGRESS NOTES
Nephrology ([facility]) Progress Note      Patient:  Nabeel Juarez  YOB: 1931  Date of Service: 8/1/2021  MRN: [id number]   Acct: [id number]   Primary Care Physician: Provider, No Known  Advance Directive:   There are no questions and answers to display.     Admit Date: 7/24/2021       Hospital Day: 0  Referring Provider: Chance Wiggins MD      Patient personally seen and examined.  Complete chart including Consults, Notes, Operative Reports, Labs, Cardiology, and Radiology studies reviewed as able.        Subjective:  Nabeel Juarez is a 89 y.o. male  whom we were consulted for chronic kidney disease.  Patient is a transfer from [facility] after a hospital admission for fluid overload and cellulitis of the lower extremities.  Patient has a history of lymphedema of the lower extremities.  He has chronic venous stasis ulcerations and was also having cellulitic changes with drainage.  He was managed with diuretics and antibiotics.  He was transferred to [facility] to continue diuretics and physical therapy.  His medical history is positive for congestive heart failure, chronic kidney disease stage IIIb, recurrent urine tract infection, hypertension and lymphedema of the lower extremities.  He has seen nephrology in the past.     Today, no new events.  He denies dysuria, cp/n/v.  Up in chair with feet hanging down again in similar fashion to previous days.  No family present.  Questions about disposition.    Temp- 97.5  Pulse- 83  Resp- 14  BP- 106/53  O2%- 98         Allergies:  Penicillins    Home Meds:  No medications prior to admission.       Medicines:    Past Medical History:  No past medical history on file.    Past Surgical History:  No past surgical history on file.    Family History  No family history on file.    Social History  Social History     Socioeconomic History   • Marital status:      Spouse name: Not on file   • Number of  children: Not on file   • Years of education: Not on file   • Highest education level: Not on file         Review of Systems:  History obtained from chart review and the patient  General ROS: No fever or chills  Respiratory ROS: No cough, shortness of breath, wheezing  Cardiovascular ROS: No chest pain or palpitations  Gastrointestinal ROS: No abdominal pain or melena  Genito-Urinary ROS: No dysuria or hematuria    Objective:  No data found.  No intake or output data in the 24 hours ending 08/01/21 2002  General: awake/alert   Chest:  clear to auscultation bilaterally without respiratory distress  CVS: regular rate and rhythm  Abdominal: soft, nontender, positive bowel sounds  Extremities: ble edema  Skin: warm/dry      Labs:  Results from last 7 days   Lab Units 07/29/21  0501 07/27/21  0509 07/26/21  0535   WBC 10*3/mm3 12.52* 11.56* 11.77*   HEMOGLOBIN g/dL 11.8* 11.5* 11.9*   HEMATOCRIT % 36.6* 35.7* 37.8   PLATELETS 10*3/mm3 271 279 259         Results from last 7 days   Lab Units 08/01/21  0423 07/31/21  0427 07/30/21  0420 07/27/21  0510 07/26/21  0535   SODIUM mmol/L 137 138 137   < > 138   POTASSIUM mmol/L 4.0 3.7 3.6   < > 4.8   CHLORIDE mmol/L 96* 96* 96*   < > 97*   CO2 mmol/L 29.0 29.0 30.0*   < > 27.0   BUN mg/dL 52* 50* 52*   < > 49*   CREATININE mg/dL 2.01* 2.01* 1.98*   < > 1.79*   CALCIUM mg/dL 9.3 9.0 9.1   < > 9.1   BILIRUBIN mg/dL  --   --   --   --  0.4   ALK PHOS U/L  --   --   --   --  88   ALT (SGPT) U/L  --   --   --   --  31   AST (SGOT) U/L  --   --   --   --  45*   GLUCOSE mg/dL 144* 157* 161*   < > 127*    < > = values in this interval not displayed.       Radiology:   Imaging Results (Last 72 Hours)     ** No results found for the last 72 hours. **          Culture:  No results found for: BLOODCX, URINECX, WOUNDCX, MRSACX, RESPCX, STOOLCX      Assessment   CKD 3b  ble lymphedema/cellulitis  Anemia  HTN  Anemia    Plan:  Continue IV lasix continuous infusion  Monitor labs closely all  week, his creatinine is slightly increased over the week but in general is tolerated this well and will continue for now  Again encouraged patient to keep feet elevated when in chair or bed      Herve Cruz MD  8/1/2021  20:02 CDT

## 2021-08-03 LAB
ANION GAP SERPL CALCULATED.3IONS-SCNC: 13 MMOL/L (ref 5–15)
BASOPHILS # BLD AUTO: 0.11 10*3/MM3 (ref 0–0.2)
BASOPHILS NFR BLD AUTO: 0.9 % (ref 0–1.5)
BUN SERPL-MCNC: 58 MG/DL (ref 8–23)
BUN/CREAT SERPL: 26.4 (ref 7–25)
CALCIUM SPEC-SCNC: 9.4 MG/DL (ref 8.2–9.6)
CHLORIDE SERPL-SCNC: 95 MMOL/L (ref 98–107)
CO2 SERPL-SCNC: 29 MMOL/L (ref 22–29)
CREAT SERPL-MCNC: 2.2 MG/DL (ref 0.76–1.27)
DEPRECATED RDW RBC AUTO: 52.5 FL (ref 37–54)
EOSINOPHIL # BLD AUTO: 0.4 10*3/MM3 (ref 0–0.4)
EOSINOPHIL NFR BLD AUTO: 3.4 % (ref 0.3–6.2)
ERYTHROCYTE [DISTWIDTH] IN BLOOD BY AUTOMATED COUNT: 15.3 % (ref 12.3–15.4)
GFR SERPL CREATININE-BSD FRML MDRD: 28 ML/MIN/1.73
GLUCOSE SERPL-MCNC: 142 MG/DL (ref 65–99)
HCT VFR BLD AUTO: 38.2 % (ref 37.5–51)
HGB BLD-MCNC: 12.3 G/DL (ref 13–17.7)
IMM GRANULOCYTES # BLD AUTO: 0.3 10*3/MM3 (ref 0–0.05)
IMM GRANULOCYTES NFR BLD AUTO: 2.6 % (ref 0–0.5)
LYMPHOCYTES # BLD AUTO: 1.4 10*3/MM3 (ref 0.7–3.1)
LYMPHOCYTES NFR BLD AUTO: 12 % (ref 19.6–45.3)
MCH RBC QN AUTO: 30.4 PG (ref 26.6–33)
MCHC RBC AUTO-ENTMCNC: 32.2 G/DL (ref 31.5–35.7)
MCV RBC AUTO: 94.6 FL (ref 79–97)
MONOCYTES # BLD AUTO: 1.06 10*3/MM3 (ref 0.1–0.9)
MONOCYTES NFR BLD AUTO: 9.1 % (ref 5–12)
NEUTROPHILS NFR BLD AUTO: 72 % (ref 42.7–76)
NEUTROPHILS NFR BLD AUTO: 8.43 10*3/MM3 (ref 1.7–7)
NRBC BLD AUTO-RTO: 0 /100 WBC (ref 0–0.2)
PLATELET # BLD AUTO: 290 10*3/MM3 (ref 140–450)
PMV BLD AUTO: 9.3 FL (ref 6–12)
POTASSIUM SERPL-SCNC: 3.9 MMOL/L (ref 3.5–5.2)
RBC # BLD AUTO: 4.04 10*6/MM3 (ref 4.14–5.8)
SODIUM SERPL-SCNC: 137 MMOL/L (ref 136–145)
WBC # BLD AUTO: 11.7 10*3/MM3 (ref 3.4–10.8)

## 2021-08-03 PROCEDURE — 80048 BASIC METABOLIC PNL TOTAL CA: CPT | Performed by: INTERNAL MEDICINE

## 2021-08-03 PROCEDURE — 85025 COMPLETE CBC W/AUTO DIFF WBC: CPT | Performed by: INTERNAL MEDICINE

## 2021-08-03 PROCEDURE — 25010000002 ENOXAPARIN PER 10 MG: Performed by: NURSE PRACTITIONER

## 2021-08-03 RX ORDER — LACTULOSE 20 G/30ML
20 SOLUTION ORAL ONCE
Status: DISCONTINUED | OUTPATIENT
Start: 2021-08-03 | End: 2021-08-07 | Stop reason: HOSPADM

## 2021-08-03 NOTE — PROGRESS NOTES
Nephrology ([facility]) Progress Note      Patient:  Nabeel Juarez  YOB: 1931  Date of Service: 8/3/2021  MRN: [id number]   Acct: [id number]   Primary Care Physician: Provider, No Known  Advance Directive:   There are no questions and answers to display.     Admit Date: 7/24/2021       Hospital Day: 0  Referring Provider: Chance Wiggins MD      Patient personally seen and examined.  Complete chart including Consults, Notes, Operative Reports, Labs, Cardiology, and Radiology studies reviewed as able.    Chief complaint: Abnormal labs.    Subjective:  Nabeel Juarez is a 89 y.o. male  whom we were consulted for chronic kidney disease.  Patient is a transfer from [facility] after a hospital admission for fluid overload and cellulitis of the lower extremities.  Patient has a history of lymphedema of the lower extremities.  He has chronic venous stasis ulcerations and was also having cellulitic changes with drainage.  He was managed with diuretics and antibiotics.  He was transferred to [facility] to continue diuretics and physical therapy.  His medical history is positive for congestive heart failure, chronic kidney disease stage IIIb, recurrent urine tract infection, hypertension and lymphedema of the lower extremities.  He has seen nephrology in the past.     This afternoon he is complaining of severe constipation and has some belly pain.  He did not have any bowel movement in 3 days.  However he is responding good to p.o. diuretics.        Allergies:  Penicillins    Home Meds:  No medications prior to admission.       Medicines:    Past Medical History:  No past medical history on file.    Past Surgical History:  No past surgical history on file.    Family History  No family history on file.    Social History  Social History     Socioeconomic History   • Marital status:      Spouse name: Not on file   • Number of children: Not on file   •  Years of education: Not on file   • Highest education level: Not on file         Review of Systems:  History obtained from chart review and the patient  General ROS: No fever or chills  Respiratory ROS: No cough, shortness of breath, wheezing  Cardiovascular ROS: No chest pain or palpitations  Gastrointestinal ROS: No abdominal pain or melena  Genito-Urinary ROS: No dysuria or hematuria    Objective:  Blood pressure: 95/65 mmHg  Heart rate is 70 bpm  O2 saturation 96%.    General: awake/alert   HEENT: Normocephalic atraumatic head  Neck: Supple with no JVD or carotid bruits.  Chest:  clear to auscultation bilaterally without respiratory distress  CVS: regular rate and rhythm  Abdominal: soft, nontender, positive bowel sounds  Extremities: Bilateral 3+ edema with multiple skin ulcers.  Skin: warm/dry      Labs:  Results from last 7 days   Lab Units 08/03/21  0422 08/02/21  0525 07/29/21  0501   WBC 10*3/mm3 11.70* 12.18* 12.52*   HEMOGLOBIN g/dL 12.3* 12.5* 11.8*   HEMATOCRIT % 38.2 38.7 36.6*   PLATELETS 10*3/mm3 290 275 271         Results from last 7 days   Lab Units 08/03/21  0422 08/02/21  0525 08/01/21  0423   SODIUM mmol/L 137 136 137   POTASSIUM mmol/L 3.9 3.8 4.0   CHLORIDE mmol/L 95* 94* 96*   CO2 mmol/L 29.0 25.0 29.0   BUN mg/dL 58* 55* 52*   CREATININE mg/dL 2.20* 2.26* 2.01*   CALCIUM mg/dL 9.4 9.1 9.3   GLUCOSE mg/dL 142* 145* 144*       Radiology:   Imaging Results (Last 72 Hours)     ** No results found for the last 72 hours. **          Culture:  No results found for: BLOODCX, URINECX, WOUNDCX, MRSACX, RESPCX, STOOLCX      Assessment   1.  Stage IIIb chronic kidney disease baseline.  2.  Hypertensive renal disease.  3.  Severe abdominal obesity.  4.  Bilateral lower extremity lymphedema.  5.  Anemia of chronic kidney disease.  6.  Benign essential hypertension.      Plan:  1.  Change to p.o. Bumex.  2.  P.o. Zaroxolyn.  3.  Monitor renal functions.      Perez Loya MD  8/3/2021  15:03 CDT

## 2021-08-03 NOTE — PROGRESS NOTES
Feliciano Wiggins M.D.  PRESTON Rizo        Internal Medicine Progress Note    8/3/2021   16:55 CDT    Name:  Nabeel Juarez  MRN:    [id number]     Acct:     [id number]   Room:  [address] [place] [address] Day: 0     Admit Date: 7/24/2021 11:14 PM  PCP: Provider, No Known    Subjective:     C/C: need for continued diuresis, wound care and antibiotic therapy    Interval History: Status:  stayed the same.  Up in chair.  No family at bedside.  Afebrile.  Strongly recommended bilateral lower extremity elevation.  Reports back pain is overall controlled.  Creatinine 2.20, stable compared to yesterday.  IV dislodged, lasix gtt changed to po bumex per nephrology.  Patient still refusing dialysis treatments, states he saw a family member go through that and he wasn't doing it.  C/o constipation today.          Review of Systems   Constitutional: Positive for malaise/fatigue. Negative for chills, decreased appetite, weight gain and weight loss.   HENT: Negative for congestion, ear discharge, hoarse voice and tinnitus.    Eyes: Negative for blurred vision, discharge, visual disturbance and visual halos.   Cardiovascular: Positive for dyspnea on exertion and leg swelling. Negative for chest pain, claudication, irregular heartbeat, orthopnea and paroxysmal nocturnal dyspnea.   Respiratory: Positive for shortness of breath. Negative for cough, sputum production and wheezing.    Endocrine: Negative for cold intolerance, heat intolerance and polyuria.   Hematologic/Lymphatic: Negative for adenopathy. Does not bruise/bleed easily.   Skin: Positive for poor wound healing. Negative for dry skin, itching and suspicious lesions.   Musculoskeletal: Negative for arthritis, back pain, falls, joint pain, muscle weakness and myalgias.   Gastrointestinal: Positive for constipation. Negative for abdominal pain, diarrhea, dysphagia and hematemesis.   Genitourinary: Negative for bladder incontinence, dysuria and frequency.    Neurological: Positive for weakness. Negative for aphonia, disturbances in coordination and dizziness.   Psychiatric/Behavioral: Negative for altered mental status, depression, memory loss and substance abuse. The patient does not have insomnia and is not nervous/anxious.          Medications:     Allergies:   Allergies   Allergen Reactions   • Penicillins Swelling       Current Meds:   Current Facility-Administered Medications:   •  acetaminophen (TYLENOL) tablet 650 mg, 650 mg, Oral, Q6H PRN, Chance Wiggins MD  •  allopurinol (ZYLOPRIM) tablet 100 mg, 100 mg, Oral, Daily, Chance Wiggins MD  •  aluminum-magnesium hydroxide-simethicone (MAALOX MAX) 400-400-40 MG/5ML suspension 30 mL, 30 mL, Oral, Q4H PRN, Chance Wiggins MD  •  aspirin chewable tablet 81 mg, 81 mg, Oral, Daily, Chance Wiggins MD  •  atorvastatin (LIPITOR) tablet 10 mg, 10 mg, Oral, Nightly, Chance Wiggins MD  •  bisacodyl (DULCOLAX) suppository 10 mg, 10 mg, Rectal, Nightly PRN, Chance Wiggins MD  •  bumetanide (BUMEX) tablet 2 mg, 2 mg, Oral, BID, Perez Loya MD  •  carvedilol (COREG) tablet 12.5 mg, 12.5 mg, Oral, BID With Meals, Chance Wiggins MD  •  enoxaparin (LOVENOX) syringe 30 mg, 30 mg, Subcutaneous, Q24H, Rachael Chambers APRN  •  finasteride (PROSCAR) tablet 5 mg, 5 mg, Oral, Daily, Chance Wiggins MD  •  HYDROcodone-acetaminophen (NORCO) 7.5-325 MG per tablet 1 tablet, 1 tablet, Oral, Q4H PRN, Chance Wiggins MD  •  lactulose solution 20 g, 20 g, Oral, Once, Perez Loya MD  •  magnesium hydroxide (MILK OF MAGNESIA) suspension 2400 mg/10mL 10 mL, 10 mL, Oral, Daily PRN, Chance Wiggins MD  •  metOLazone (ZAROXOLYN) tablet 2.5 mg, 2.5 mg, Oral, QAM, Perez Loya MD  •  ondansetron (ZOFRAN) injection 4 mg, 4 mg, Intravenous, Q6H PRN, Chance Wiggins MD  •  saccharomyces boulardii (FLORASTOR) capsule 500 mg, 500 mg, Oral, BID, Chance Wiggins MD  •   "spironolactone (ALDACTONE) tablet 25 mg, 25 mg, Oral, Daily, Chance Wiggins MD    Data:     Code Status:    There are no questions and answers to display.       No family history on file.    Social History     Socioeconomic History   • Marital status:      Spouse name: Not on file   • Number of children: Not on file   • Years of education: Not on file   • Highest education level: Not on file       Vitals:  Ht 180.3 cm (71\")   Wt (!) 138 kg (304 lb 11.2 oz)   BMI 42.50 kg/m²     T 97.8 P 77 R 16 BP 95/53 Sp02 92% (room air)        I/O (24Hr):  No intake or output data in the 24 hours ending 08/03/21 1655    Labs and imaging:      No results found for this or any previous visit (from the past 12 hour(s)).            Physical Examination:        Physical Exam  Vitals and nursing note reviewed.   Constitutional:       Appearance: He is well-developed.   HENT:      Head: Normocephalic and atraumatic.      Nose: Nose normal.   Eyes:      Pupils: Pupils are equal, round, and reactive to light.   Cardiovascular:      Rate and Rhythm: Normal rate and regular rhythm.      Heart sounds: Normal heart sounds.   Pulmonary:      Effort: Pulmonary effort is normal.      Breath sounds: Normal breath sounds.   Abdominal:      General: Bowel sounds are normal.      Palpations: Abdomen is soft.      Comments: obese   Musculoskeletal:         General: Normal range of motion.      Cervical back: Normal range of motion and neck supple.      Right lower leg: Edema present.      Left lower leg: Edema present.      Comments: Generalized weakness   Skin:     General: Skin is warm and dry.      Comments: Scaling and flaking of skin to BLE     Neurological:      Mental Status: He is alert and oriented to person, place, and time.      Deep Tendon Reflexes: Reflexes are normal and symmetric.   Psychiatric:         Behavior: Behavior normal.           Assessment:               Plan:        1. Venous stasis ulcer  2. Acute on " chronic congestive heart failure  3. Chronic kidney disease stage lll  4. Chronic lymphedema  5. Candida intertrigo  6. BHP  7. Hypertension  8. Hyperlipidemia  9. Osteoarthritis bilateral knees  10. Anemia  11. PAF  12. Morbid obesity  13. Pseudomonas wound infection    Continue current treatment. Monitor counts. Increase activity. Labs performed today. Continue diuresis per nephrology, changed to po bumex. Monitor renal function closely. Aggressive therapies as tolerated.  Watch off antibiotics for scant growth pseudomonas from right ankle wound. PRN meds given for constipation.      Electronically signed by PRESTON Pickering on 8/3/2021 at 16:55 CDT

## 2021-08-04 LAB
ANION GAP SERPL CALCULATED.3IONS-SCNC: 14 MMOL/L (ref 5–15)
BUN SERPL-MCNC: 63 MG/DL (ref 8–23)
BUN/CREAT SERPL: 29.3 (ref 7–25)
CALCIUM SPEC-SCNC: 9.4 MG/DL (ref 8.2–9.6)
CHLORIDE SERPL-SCNC: 93 MMOL/L (ref 98–107)
CO2 SERPL-SCNC: 31 MMOL/L (ref 22–29)
CREAT SERPL-MCNC: 2.15 MG/DL (ref 0.76–1.27)
GFR SERPL CREATININE-BSD FRML MDRD: 29 ML/MIN/1.73
GLUCOSE SERPL-MCNC: 147 MG/DL (ref 65–99)
POTASSIUM SERPL-SCNC: 4.2 MMOL/L (ref 3.5–5.2)
SODIUM SERPL-SCNC: 138 MMOL/L (ref 136–145)

## 2021-08-04 PROCEDURE — 25010000002 ENOXAPARIN PER 10 MG: Performed by: NURSE PRACTITIONER

## 2021-08-04 PROCEDURE — 80048 BASIC METABOLIC PNL TOTAL CA: CPT | Performed by: INTERNAL MEDICINE

## 2021-08-04 NOTE — PROGRESS NOTES
BRAULIO Garland APRN        Internal Medicine Progress Note    8/4/2021   13:03 CDT    Name:  Nabeel Juarez  MRN:    [id number]     Acct:     [id number]   Room:  [address] [place] [address] Day: 0     Admit Date: 7/24/2021 11:14 PM  PCP: Provider, No Known    Subjective:     C/C: need for continued diuresis, wound care and antibiotic therapy    Interval History: Status:  stayed the same.  Up in chair.  No family at bedside.  Afebrile.  Continuing to encourage bilateral lower extremity elevation.  Encourage participation in therapy.        Review of Systems   Constitutional: Positive for malaise/fatigue. Negative for chills, decreased appetite, weight gain and weight loss.   HENT: Negative for congestion, ear discharge, hoarse voice and tinnitus.    Eyes: Negative for blurred vision, discharge, visual disturbance and visual halos.   Cardiovascular: Positive for dyspnea on exertion and leg swelling. Negative for chest pain, claudication, irregular heartbeat, orthopnea and paroxysmal nocturnal dyspnea.   Respiratory: Positive for shortness of breath. Negative for cough, sputum production and wheezing.    Endocrine: Negative for cold intolerance, heat intolerance and polyuria.   Hematologic/Lymphatic: Negative for adenopathy. Does not bruise/bleed easily.   Skin: Positive for poor wound healing. Negative for dry skin, itching and suspicious lesions.   Musculoskeletal: Negative for arthritis, back pain, falls, joint pain, muscle weakness and myalgias.   Gastrointestinal: Positive for constipation. Negative for abdominal pain, diarrhea, dysphagia and hematemesis.   Genitourinary: Negative for bladder incontinence, dysuria and frequency.   Neurological: Positive for weakness. Negative for aphonia, disturbances in coordination and dizziness.   Psychiatric/Behavioral: Negative for altered mental status, depression, memory loss and substance abuse. The patient does not have insomnia and is not  nervous/anxious.          Medications:     Allergies:   Allergies   Allergen Reactions    Penicillins Swelling       Current Meds:   Current Facility-Administered Medications:     acetaminophen (TYLENOL) tablet 650 mg, 650 mg, Oral, Q6H PRN, Chance Wiggins MD    allopurinol (ZYLOPRIM) tablet 100 mg, 100 mg, Oral, Daily, Chance Wiggins MD    aluminum-magnesium hydroxide-simethicone (MAALOX MAX) 400-400-40 MG/5ML suspension 30 mL, 30 mL, Oral, Q4H PRN, Chance Wiggins MD    aspirin chewable tablet 81 mg, 81 mg, Oral, Daily, Chance Wiggins MD    atorvastatin (LIPITOR) tablet 10 mg, 10 mg, Oral, Nightly, Chance Wiggins MD    bisacodyl (DULCOLAX) suppository 10 mg, 10 mg, Rectal, Nightly PRN, Chance Wiggins MD    bumetanide (BUMEX) tablet 2 mg, 2 mg, Oral, BID, Perez Loya MD    carvedilol (COREG) tablet 12.5 mg, 12.5 mg, Oral, BID With Meals, Chance Wiggins MD    enoxaparin (LOVENOX) syringe 30 mg, 30 mg, Subcutaneous, Q24H, Rachael Chambers APRN    finasteride (PROSCAR) tablet 5 mg, 5 mg, Oral, Daily, Chance Wiggins MD    HYDROcodone-acetaminophen (NORCO) 7.5-325 MG per tablet 1 tablet, 1 tablet, Oral, Q4H PRN, Chance Wiggins MD    lactulose solution 20 g, 20 g, Oral, Once, Perez Loya MD    magnesium hydroxide (MILK OF MAGNESIA) suspension 2400 mg/10mL 10 mL, 10 mL, Oral, Daily PRN, Chance Wiggins MD    metOLazone (ZAROXOLYN) tablet 2.5 mg, 2.5 mg, Oral, QAM, Perez Loya MD    ondansetron (ZOFRAN) injection 4 mg, 4 mg, Intravenous, Q6H PRN, Chance Wiggins MD    saccharomyces boulardii (FLORASTOR) capsule 500 mg, 500 mg, Oral, BID, Chance Wiggins MD    spironolactone (ALDACTONE) tablet 25 mg, 25 mg, Oral, Daily, Chance Wiggins MD    Data:     Code Status:    There are no questions and answers to display.       No family history on file.    Social History     Socioeconomic History    Marital status:       "Spouse name: Not on file    Number of children: Not on file    Years of education: Not on file    Highest education level: Not on file       Vitals:  Ht 180.3 cm (71\")   Wt (!) 138 kg (304 lb 11.2 oz)   BMI 42.50 kg/m²     T 97.5 pulse 80 respirate 24 blood pressure 107/45 sat 98% (room air)        I/O (24Hr):  No intake or output data in the 24 hours ending 08/04/21 1303    Labs and imaging:      Recent Results (from the past 12 hour(s))   Basic Metabolic Panel    Collection Time: 08/04/21  4:37 AM    Specimen: Blood   Result Value Ref Range    Glucose 147 (H) 65 - 99 mg/dL    BUN 63 (H) 8 - 23 mg/dL    Creatinine 2.15 (H) 0.76 - 1.27 mg/dL    Sodium 138 136 - 145 mmol/L    Potassium 4.2 3.5 - 5.2 mmol/L    Chloride 93 (L) 98 - 107 mmol/L    CO2 31.0 (H) 22.0 - 29.0 mmol/L    Calcium 9.4 8.2 - 9.6 mg/dL    eGFR Non African Amer 29 (L) >60 mL/min/1.73    BUN/Creatinine Ratio 29.3 (H) 7.0 - 25.0    Anion Gap 14.0 5.0 - 15.0 mmol/L               Physical Examination:        Physical Exam  Vitals and nursing note reviewed.   Constitutional:       Appearance: He is well-developed.   HENT:      Head: Normocephalic and atraumatic.      Nose: Nose normal.   Eyes:      Pupils: Pupils are equal, round, and reactive to light.   Cardiovascular:      Rate and Rhythm: Normal rate and regular rhythm.      Heart sounds: Normal heart sounds.   Pulmonary:      Effort: Pulmonary effort is normal.      Breath sounds: Normal breath sounds.   Abdominal:      General: Bowel sounds are normal.      Palpations: Abdomen is soft.      Comments: obese   Musculoskeletal:         General: Normal range of motion.      Cervical back: Normal range of motion and neck supple.      Right lower leg: Edema present.      Left lower leg: Edema present.      Comments: Generalized weakness   Skin:     General: Skin is warm and dry.      Comments: Scaling and flaking of skin to BLE     Neurological:      Mental Status: He is alert and oriented to person, " place, and time.      Deep Tendon Reflexes: Reflexes are normal and symmetric.   Psychiatric:         Behavior: Behavior normal.           Assessment:               Plan:        Venous stasis ulcer  Acute on chronic congestive heart failure  Chronic kidney disease stage lll  Chronic lymphedema  Candida intertrigo  BHP  Hypertension  Hyperlipidemia  Osteoarthritis bilateral knees  Anemia  PAF  Morbid obesity  Pseudomonas wound infection    Continue current treatment. Monitor counts. Increase activity. Labs performed today. Continue diuresis per nephrology.  Continue to watch off antibiotics.  Continue to urge more therapy.      Electronically signed by Chance Wiggins MD on 8/4/2021 at 13:03 CDT

## 2021-08-04 NOTE — PROGRESS NOTES
Nephrology ([facility]) Progress Note      Patient:  Nabeel Juarez  YOB: 1931  Date of Service: 8/4/2021  MRN: [id number]   Acct: [id number]   Primary Care Physician: Provider, No Known  Advance Directive:   There are no questions and answers to display.     Admit Date: 7/24/2021       Hospital Day: 0  Referring Provider: Chance Wiggins MD      Patient personally seen and examined.  Complete chart including Consults, Notes, Operative Reports, Labs, Cardiology, and Radiology studies reviewed as able.    Chief complaint: Abnormal labs.    Subjective:  Nabeel Juarez is a 89 y.o. male  whom we were consulted for chronic kidney disease.  Patient is a transfer from [facility] after a hospital admission for fluid overload and cellulitis of the lower extremities.  Patient has a history of lymphedema of the lower extremities.  He has chronic venous stasis ulcerations and was also having cellulitic changes with drainage.  He was managed with diuretics and antibiotics.  He was transferred to [facility] to continue diuretics and physical therapy.  His medical history is positive for congestive heart failure, chronic kidney disease stage IIIb, recurrent urine tract infection, hypertension and lymphedema of the lower extremities.  He has seen nephrology in the past.     This afternoon he is sitting up and eating lunch.  He feels well he is responding to diuretics.      Allergies:  Penicillins    Home Meds:  No medications prior to admission.       Medicines:    Past Medical History:  No past medical history on file.    Past Surgical History:  No past surgical history on file.    Family History  No family history on file.    Social History  Social History     Socioeconomic History   • Marital status:      Spouse name: Not on file   • Number of children: Not on file   • Years of education: Not on file   • Highest education level: Not on file          Review of Systems:  History obtained from chart review and the patient  General ROS: No fever or chills  Respiratory ROS: No cough, shortness of breath, wheezing  Cardiovascular ROS: No chest pain or palpitations  Gastrointestinal ROS: No abdominal pain or melena  Genito-Urinary ROS: No dysuria or hematuria    Objective:  Blood pressure: 107/59 mmHg  Heart rate is 70 bpm  O2 saturation 96%.    General: awake/alert   HEENT: Normocephalic atraumatic head  Neck: Supple with no JVD or carotid bruits.  Chest:  clear to auscultation bilaterally without respiratory distress  CVS: regular rate and rhythm  Abdominal: soft, nontender, positive bowel sounds  Extremities: Bilateral 3+ edema with multiple skin ulcers.  Skin: warm/dry      Labs:  Results from last 7 days   Lab Units 08/03/21  0422 08/02/21  0525 07/29/21  0501   WBC 10*3/mm3 11.70* 12.18* 12.52*   HEMOGLOBIN g/dL 12.3* 12.5* 11.8*   HEMATOCRIT % 38.2 38.7 36.6*   PLATELETS 10*3/mm3 290 275 271         Results from last 7 days   Lab Units 08/04/21  0437 08/03/21  0422 08/02/21  0525   SODIUM mmol/L 138 137 136   POTASSIUM mmol/L 4.2 3.9 3.8   CHLORIDE mmol/L 93* 95* 94*   CO2 mmol/L 31.0* 29.0 25.0   BUN mg/dL 63* 58* 55*   CREATININE mg/dL 2.15* 2.20* 2.26*   CALCIUM mg/dL 9.4 9.4 9.1   GLUCOSE mg/dL 147* 142* 145*       Radiology:   Imaging Results (Last 72 Hours)     ** No results found for the last 72 hours. **          Culture:  No results found for: BLOODCX, URINECX, WOUNDCX, MRSACX, RESPCX, STOOLCX      Assessment   1.  Stage IIIb chronic kidney disease baseline.  2.  Hypertensive renal disease.  3.  Severe abdominal obesity.  4.  Bilateral lower extremity lymphedema.  5.  Anemia of chronic kidney disease.  6.  Benign essential hypertension.      Plan:  1.  Continue p.o. Bumex.  2.  Intermittent p.o. Zaroxolyn.  3.  Monitor renal functions.      Perez Loya MD  8/4/2021  12:04 CDT

## 2021-08-05 PROCEDURE — 97164 PT RE-EVAL EST PLAN CARE: CPT

## 2021-08-05 PROCEDURE — 97530 THERAPEUTIC ACTIVITIES: CPT

## 2021-08-05 PROCEDURE — 25010000002 ENOXAPARIN PER 10 MG: Performed by: NURSE PRACTITIONER

## 2021-08-05 RX ORDER — ACETAMINOPHEN 500 MG
500 TABLET ORAL NIGHTLY
Status: DISCONTINUED | OUTPATIENT
Start: 2021-08-05 | End: 2021-08-07 | Stop reason: HOSPADM

## 2021-08-05 NOTE — PROGRESS NOTES
Feliciano Wiggins M.D.  PRESTON Rizo        Internal Medicine Progress Note    8/5/2021   12:07 CDT    Name:  Nabeel Juarez  MRN:    [id number]     Acct:     [id number]   Room:  [address] [place] [address] Day: 0     Admit Date: 7/24/2021 11:14 PM  PCP: Provider, No Known    Subjective:     C/C: need for continued diuresis, wound care and antibiotic therapy    Interval History: Status:  stayed the same.  Up in chair. Family at bedside.  Afebrile.  Continuing to encourage bilateral lower extremity elevation, patient currently sitting with legs down; states his buttocks are irritated and hurting and he cannot tolerate to have his legs elevated longer than an hour at a time.  Encourage participation in therapy, pt states he signed off on PT a week ago but is willing to have them come back and work with him twice a day. Complains of chronic back pain, states he took tylenol arthritis at night at home and wants this back to help with sleep.          Review of Systems   Constitutional: Positive for malaise/fatigue. Negative for chills, decreased appetite, weight gain and weight loss.   HENT: Negative for congestion, ear discharge, hoarse voice and tinnitus.    Eyes: Negative for blurred vision, discharge, visual disturbance and visual halos.   Cardiovascular: Positive for dyspnea on exertion and leg swelling. Negative for chest pain, claudication, irregular heartbeat, orthopnea and paroxysmal nocturnal dyspnea.   Respiratory: Positive for shortness of breath. Negative for cough, sputum production and wheezing.    Endocrine: Negative for cold intolerance, heat intolerance and polyuria.   Hematologic/Lymphatic: Negative for adenopathy. Does not bruise/bleed easily.   Skin: Positive for poor wound healing. Negative for dry skin, itching and suspicious lesions.   Musculoskeletal: Negative for arthritis, back pain, falls, joint pain, muscle weakness and myalgias.   Gastrointestinal: Positive for constipation.  Negative for abdominal pain, diarrhea, dysphagia and hematemesis.   Genitourinary: Negative for bladder incontinence, dysuria and frequency.   Neurological: Positive for weakness. Negative for aphonia, disturbances in coordination and dizziness.   Psychiatric/Behavioral: Negative for altered mental status, depression, memory loss and substance abuse. The patient does not have insomnia and is not nervous/anxious.          Medications:     Allergies:   Allergies   Allergen Reactions    Penicillins Swelling       Current Meds:   Current Facility-Administered Medications:     acetaminophen (TYLENOL) tablet 650 mg, 650 mg, Oral, Q6H PRN, Chance Wiggins MD    allopurinol (ZYLOPRIM) tablet 100 mg, 100 mg, Oral, Daily, Chance Wiggins MD    aluminum-magnesium hydroxide-simethicone (MAALOX MAX) 400-400-40 MG/5ML suspension 30 mL, 30 mL, Oral, Q4H PRN, Chance Wiggins MD    aspirin chewable tablet 81 mg, 81 mg, Oral, Daily, Chance Wiggins MD    atorvastatin (LIPITOR) tablet 10 mg, 10 mg, Oral, Nightly, Chance Wiggins MD    bisacodyl (DULCOLAX) suppository 10 mg, 10 mg, Rectal, Nightly PRN, Chance Wiggins MD    bumetanide (BUMEX) tablet 2 mg, 2 mg, Oral, BID, Peerz Loya MD    carvedilol (COREG) tablet 12.5 mg, 12.5 mg, Oral, BID With Meals, Chance Wiggins MD    enoxaparin (LOVENOX) syringe 30 mg, 30 mg, Subcutaneous, Q24H, Rachael Chambers APRN    finasteride (PROSCAR) tablet 5 mg, 5 mg, Oral, Daily, Chance Wiggins MD    HYDROcodone-acetaminophen (NORCO) 7.5-325 MG per tablet 1 tablet, 1 tablet, Oral, Q4H PRN, Chance Wiggins MD    lactulose solution 20 g, 20 g, Oral, Once, Perez Loya MD    magnesium hydroxide (MILK OF MAGNESIA) suspension 2400 mg/10mL 10 mL, 10 mL, Oral, Daily PRN, Chance Wiggins MD    metOLazone (ZAROXOLYN) tablet 2.5 mg, 2.5 mg, Oral, QAM, Perez Loya MD    ondansetron (ZOFRAN) injection 4 mg, 4 mg, Intravenous, Q6H PRN,  "Chance Wiggins MD    saccharomyces boulardii (FLORASTOR) capsule 500 mg, 500 mg, Oral, BID, Chance Wiggins MD    spironolactone (ALDACTONE) tablet 25 mg, 25 mg, Oral, Daily, Chance Wiggins MD    Data:     Code Status:    There are no questions and answers to display.       No family history on file.    Social History     Socioeconomic History    Marital status:      Spouse name: Not on file    Number of children: Not on file    Years of education: Not on file    Highest education level: Not on file       Vitals:  Ht 180.3 cm (71\")   Wt (!) 138 kg (304 lb 11.2 oz)   BMI 42.50 kg/m²     T 97.4 pulse 83 respirations 18 blood pressure 110/54 sat 94% (room air)        I/O (24Hr):  No intake or output data in the 24 hours ending 08/05/21 1207    Labs and imaging:      No results found for this or any previous visit (from the past 12 hour(s)).            Physical Examination:        Physical Exam  Vitals and nursing note reviewed.   Constitutional:       Appearance: He is well-developed.   HENT:      Head: Normocephalic and atraumatic.      Nose: Nose normal.   Eyes:      Pupils: Pupils are equal, round, and reactive to light.   Cardiovascular:      Rate and Rhythm: Normal rate and regular rhythm.      Heart sounds: Normal heart sounds.   Pulmonary:      Effort: Pulmonary effort is normal.      Breath sounds: Normal breath sounds.   Abdominal:      General: Bowel sounds are normal.      Palpations: Abdomen is soft.      Comments: obese   Musculoskeletal:         General: Normal range of motion.      Cervical back: Normal range of motion and neck supple.      Right lower leg: Edema present.      Left lower leg: Edema present.      Comments: Generalized weakness   Skin:     General: Skin is warm and dry.      Comments: Scaling and flaking of skin to BLE     Neurological:      Mental Status: He is alert and oriented to person, place, and time.      Deep Tendon Reflexes: Reflexes are normal and " symmetric.   Psychiatric:         Behavior: Behavior normal.           Assessment:               Plan:        Venous stasis ulcer  Acute on chronic congestive heart failure  Chronic kidney disease stage lll  Chronic lymphedema  Candida intertrigo  BHP  Hypertension  Hyperlipidemia  Osteoarthritis bilateral knees  Anemia  PAF  Morbid obesity  Pseudomonas wound infection    Continue current treatment. Monitor counts. Increase activity, will re-consult PT to work with patient. Labs in AM, monitor renal function closely. Continue diuresis per nephrology.  Continue to watch off antibiotics.  Continue to urge more therapy and compliance with leg elevation.  Will add home tylenol back for night time.        Electronically signed by PRESTON Pickering on 8/5/2021 at 12:07 CDT     I have discussed the care of Nabeel Juarez, including pertinent history and exam findings, with the nurse practitioner.    I have seen and examined the patient and the key elements of all parts of the encounter have been performed by me.  I agree with the assessment, plan and orders as documented by Katherine JOHNSTON, after I modified the exam findings and the plan of treatments and the final version is my approved version of the assessment.        Electronically signed by Chance Wiggins MD on 8/5/2021 at 19:49 CDT

## 2021-08-05 NOTE — PROGRESS NOTES
Nephrology ([facility]) Progress Note      Patient:  Nabeel Juarez  YOB: 1931  Date of Service: 8/5/2021  MRN: [id number]   Acct: [id number]   Primary Care Physician: Provider, No Known  Advance Directive:   There are no questions and answers to display.     Admit Date: 7/24/2021       Hospital Day: 0  Referring Provider: Chance Wiggins MD      Patient personally seen and examined.  Complete chart including Consults, Notes, Operative Reports, Labs, Cardiology, and Radiology studies reviewed as able.    Chief complaint: Abnormal labs.    Subjective:  Nabeel Juarez is a 89 y.o. male  whom we were consulted for chronic kidney disease.  Patient is a transfer from [facility] after a hospital admission for fluid overload and cellulitis of the lower extremities.  Patient has a history of lymphedema of the lower extremities.  He has chronic venous stasis ulcerations and was also having cellulitic changes with drainage.  He was managed with diuretics and antibiotics.  He was transferred to [facility] to continue diuretics and physical therapy.  His medical history is positive for congestive heart failure, chronic kidney disease stage IIIb, recurrent urine tract infection, hypertension and lymphedema of the lower extremities.  He has seen nephrology in the past.     This afternoon he feels good.  He denies any shortness of breath and he had good urine output.    Allergies:  Penicillins    Home Meds:  No medications prior to admission.       Medicines:    Past Medical History:  No past medical history on file.    Past Surgical History:  No past surgical history on file.    Family History  No family history on file.    Social History  Social History     Socioeconomic History   • Marital status:      Spouse name: Not on file   • Number of children: Not on file   • Years of education: Not on file   • Highest education level: Not on file         Review  of Systems:  History obtained from chart review and the patient  General ROS: No fever or chills  Respiratory ROS: No cough, shortness of breath, wheezing  Cardiovascular ROS: No chest pain or palpitations  Gastrointestinal ROS: No abdominal pain or melena  Genito-Urinary ROS: No dysuria or hematuria    Objective:  Blood pressure: 110/54 mmHg.  Heart rate is 80 bpm.  O2 saturation 96%.    General: awake/alert   HEENT: Normocephalic atraumatic head  Neck: Supple with no JVD or carotid bruits.  Chest:  clear to auscultation bilaterally without respiratory distress  CVS: regular rate and rhythm  Abdominal: soft, nontender, positive bowel sounds  Extremities: Bilateral 3+ edema with multiple skin ulcers.  Skin: warm/dry      Labs:  Results from last 7 days   Lab Units 08/03/21  0422 08/02/21  0525   WBC 10*3/mm3 11.70* 12.18*   HEMOGLOBIN g/dL 12.3* 12.5*   HEMATOCRIT % 38.2 38.7   PLATELETS 10*3/mm3 290 275         Results from last 7 days   Lab Units 08/04/21  0437 08/03/21  0422 08/02/21  0525   SODIUM mmol/L 138 137 136   POTASSIUM mmol/L 4.2 3.9 3.8   CHLORIDE mmol/L 93* 95* 94*   CO2 mmol/L 31.0* 29.0 25.0   BUN mg/dL 63* 58* 55*   CREATININE mg/dL 2.15* 2.20* 2.26*   CALCIUM mg/dL 9.4 9.4 9.1   GLUCOSE mg/dL 147* 142* 145*       Radiology:   Imaging Results (Last 72 Hours)     ** No results found for the last 72 hours. **          Culture:  No results found for: BLOODCX, URINECX, WOUNDCX, MRSACX, RESPCX, STOOLCX      Assessment   1.  Stage IIIb chronic kidney disease baseline.  2.  Hypertensive renal disease.  3.  Severe abdominal obesity.  4.  Bilateral lower extremity lymphedema.  5.  Anemia of chronic kidney disease.  6.  Benign essential hypertension.      Plan:  1.  Continue p.o. Bumex.  2.  Intermittent p.o. Zaroxolyn.  3.  Wound care.      Perez Loya MD  8/5/2021  14:26 CDT

## 2021-08-06 LAB
ANION GAP SERPL CALCULATED.3IONS-SCNC: 14 MMOL/L (ref 5–15)
BASOPHILS # BLD AUTO: 0.09 10*3/MM3 (ref 0–0.2)
BASOPHILS NFR BLD AUTO: 0.7 % (ref 0–1.5)
BUN SERPL-MCNC: 77 MG/DL (ref 8–23)
BUN/CREAT SERPL: 31.2 (ref 7–25)
CALCIUM SPEC-SCNC: 9.3 MG/DL (ref 8.2–9.6)
CHLORIDE SERPL-SCNC: 91 MMOL/L (ref 98–107)
CO2 SERPL-SCNC: 31 MMOL/L (ref 22–29)
CREAT SERPL-MCNC: 2.47 MG/DL (ref 0.76–1.27)
DEPRECATED RDW RBC AUTO: 51.8 FL (ref 37–54)
EOSINOPHIL # BLD AUTO: 0.3 10*3/MM3 (ref 0–0.4)
EOSINOPHIL NFR BLD AUTO: 2.4 % (ref 0.3–6.2)
ERYTHROCYTE [DISTWIDTH] IN BLOOD BY AUTOMATED COUNT: 15.2 % (ref 12.3–15.4)
GFR SERPL CREATININE-BSD FRML MDRD: 25 ML/MIN/1.73
GLUCOSE SERPL-MCNC: 145 MG/DL (ref 65–99)
HCT VFR BLD AUTO: 37 % (ref 37.5–51)
HGB BLD-MCNC: 12.4 G/DL (ref 13–17.7)
IMM GRANULOCYTES # BLD AUTO: 0.3 10*3/MM3 (ref 0–0.05)
IMM GRANULOCYTES NFR BLD AUTO: 2.4 % (ref 0–0.5)
LYMPHOCYTES # BLD AUTO: 1.61 10*3/MM3 (ref 0.7–3.1)
LYMPHOCYTES NFR BLD AUTO: 12.6 % (ref 19.6–45.3)
MCH RBC QN AUTO: 31.2 PG (ref 26.6–33)
MCHC RBC AUTO-ENTMCNC: 33.5 G/DL (ref 31.5–35.7)
MCV RBC AUTO: 93 FL (ref 79–97)
MONOCYTES # BLD AUTO: 1.39 10*3/MM3 (ref 0.1–0.9)
MONOCYTES NFR BLD AUTO: 10.9 % (ref 5–12)
NEUTROPHILS NFR BLD AUTO: 71 % (ref 42.7–76)
NEUTROPHILS NFR BLD AUTO: 9.06 10*3/MM3 (ref 1.7–7)
NRBC BLD AUTO-RTO: 0 /100 WBC (ref 0–0.2)
PLATELET # BLD AUTO: 265 10*3/MM3 (ref 140–450)
PMV BLD AUTO: 9.1 FL (ref 6–12)
POTASSIUM SERPL-SCNC: 3.7 MMOL/L (ref 3.5–5.2)
RBC # BLD AUTO: 3.98 10*6/MM3 (ref 4.14–5.8)
SODIUM SERPL-SCNC: 136 MMOL/L (ref 136–145)
WBC # BLD AUTO: 12.75 10*3/MM3 (ref 3.4–10.8)

## 2021-08-06 PROCEDURE — 97110 THERAPEUTIC EXERCISES: CPT

## 2021-08-06 PROCEDURE — 85025 COMPLETE CBC W/AUTO DIFF WBC: CPT | Performed by: INTERNAL MEDICINE

## 2021-08-06 PROCEDURE — 25010000002 ENOXAPARIN PER 10 MG: Performed by: NURSE PRACTITIONER

## 2021-08-06 PROCEDURE — 97530 THERAPEUTIC ACTIVITIES: CPT

## 2021-08-06 PROCEDURE — 80048 BASIC METABOLIC PNL TOTAL CA: CPT | Performed by: INTERNAL MEDICINE

## 2021-08-06 RX ORDER — BUMETANIDE 1 MG/1
1 TABLET ORAL
Status: DISCONTINUED | OUTPATIENT
Start: 2021-08-06 | End: 2021-08-07 | Stop reason: HOSPADM

## 2021-08-06 NOTE — PROGRESS NOTES
Nephrology ([facility]) Progress Note      Patient:  Nabeel Juarez  YOB: 1931  Date of Service: 8/6/2021  MRN: [id number]   Acct: [id number]   Primary Care Physician: Provider, No Known  Advance Directive:   There are no questions and answers to display.     Admit Date: 7/24/2021       Hospital Day: 0  Referring Provider: Chance Wiggins MD      Patient personally seen and examined.  Complete chart including Consults, Notes, Operative Reports, Labs, Cardiology, and Radiology studies reviewed as able.    Chief complaint: Abnormal labs.    Subjective:  Nabeel Juarez is a 89 y.o. male  whom we were consulted for chronic kidney disease.  Patient is a transfer from [facility] after a hospital admission for fluid overload and cellulitis of the lower extremities.  Patient has a history of lymphedema of the lower extremities.  He has chronic venous stasis ulcerations and was also having cellulitic changes with drainage.  He was managed with diuretics and antibiotics.  He was transferred to [facility] to continue diuretics and physical therapy.  His medical history is positive for congestive heart failure, chronic kidney disease stage IIIb, recurrent urine tract infection, hypertension and lymphedema of the lower extremities.  He has seen nephrology in the past.     This afternoon he is resting and feeling well.  He denies any shortness of breath..    Allergies:  Penicillins    Home Meds:  No medications prior to admission.       Medicines:    Past Medical History:  No past medical history on file.    Past Surgical History:  No past surgical history on file.    Family History  No family history on file.    Social History  Social History     Socioeconomic History   • Marital status:      Spouse name: Not on file   • Number of children: Not on file   • Years of education: Not on file   • Highest education level: Not on file         Review of  Systems:  History obtained from chart review and the patient  General ROS: No fever or chills  Respiratory ROS: No cough, shortness of breath, wheezing  Cardiovascular ROS: No chest pain or palpitations  Gastrointestinal ROS: No abdominal pain or melena  Genito-Urinary ROS: No dysuria or hematuria    Objective:  Blood pressure: 115/63 mmHg  Heart rate is 76 bpm.  O2 saturation 96%.    General: awake/alert   HEENT: Normocephalic atraumatic head  Neck: Supple with no JVD or carotid bruits.  Chest:  clear to auscultation bilaterally without respiratory distress  CVS: regular rate and rhythm  Abdominal: soft, nontender, positive bowel sounds  Extremities: Bilateral 3+ edema with multiple skin ulcers.  Skin: warm/dry      Labs:  Results from last 7 days   Lab Units 08/06/21  0426 08/03/21  0422 08/02/21  0525   WBC 10*3/mm3 12.75* 11.70* 12.18*   HEMOGLOBIN g/dL 12.4* 12.3* 12.5*   HEMATOCRIT % 37.0* 38.2 38.7   PLATELETS 10*3/mm3 265 290 275         Results from last 7 days   Lab Units 08/06/21  0426 08/04/21  0437 08/03/21  0422   SODIUM mmol/L 136 138 137   POTASSIUM mmol/L 3.7 4.2 3.9   CHLORIDE mmol/L 91* 93* 95*   CO2 mmol/L 31.0* 31.0* 29.0   BUN mg/dL 77* 63* 58*   CREATININE mg/dL 2.47* 2.15* 2.20*   CALCIUM mg/dL 9.3 9.4 9.4   GLUCOSE mg/dL 145* 147* 142*       Radiology:   Imaging Results (Last 72 Hours)     ** No results found for the last 72 hours. **          Culture:  No results found for: BLOODCX, URINECX, WOUNDCX, MRSACX, RESPCX, STOOLCX      Assessment   1.  Stage IIIb chronic kidney disease baseline.  2.  Hypertensive renal disease.  3.  Severe abdominal obesity.  4.  Bilateral lower extremity lymphedema.  5.  Anemia of chronic kidney disease.  6.  Benign essential hypertension.      Plan:  1.  Continue p.o. Bumex.  2.  Discontinue Zaroxolyn.  3.  Wound care.      Perez Loya MD  8/6/2021  11:55 CDT

## 2021-08-06 NOTE — PROGRESS NOTES
BRAULIO Garland APRN        Internal Medicine Progress Note    8/6/2021   13:30 CDT    Name:  Nabeel Juarez  MRN:    [id number]     Acct:     [id number]   Room:  [address] [place] [address] Day: 0     Admit Date: 7/24/2021 11:14 PM  PCP: Provider, No Known    Subjective:     C/C: need for continued diuresis, wound care and antibiotic therapy    Interval History: Status:  stayed the same.  Up in chair. Family at bedside.  Afebrile.  Continuing to encourage bilateral lower extremity elevation, patient currently sitting with legs down; nurse states patient is noncompliant with most instructions.  Encourage participation in therapy, PT re-evaluated patient yesterday.         Review of Systems   Constitutional: Positive for malaise/fatigue. Negative for chills, decreased appetite, weight gain and weight loss.   HENT: Negative for congestion, ear discharge, hoarse voice and tinnitus.    Eyes: Negative for blurred vision, discharge, visual disturbance and visual halos.   Cardiovascular: Positive for dyspnea on exertion and leg swelling. Negative for chest pain, claudication, irregular heartbeat, orthopnea and paroxysmal nocturnal dyspnea.   Respiratory: Positive for shortness of breath. Negative for cough, sputum production and wheezing.    Endocrine: Negative for cold intolerance, heat intolerance and polyuria.   Hematologic/Lymphatic: Negative for adenopathy. Does not bruise/bleed easily.   Skin: Positive for poor wound healing. Negative for dry skin, itching and suspicious lesions.   Musculoskeletal: Negative for arthritis, back pain, falls, joint pain, muscle weakness and myalgias.   Gastrointestinal: Positive for constipation. Negative for abdominal pain, diarrhea, dysphagia and hematemesis.   Genitourinary: Negative for bladder incontinence, dysuria and frequency.   Neurological: Positive for weakness. Negative for aphonia, disturbances in coordination and dizziness.    Psychiatric/Behavioral: Negative for altered mental status, depression, memory loss and substance abuse. The patient does not have insomnia and is not nervous/anxious.          Medications:     Allergies:   Allergies   Allergen Reactions   • Penicillins Swelling       Current Meds:   Current Facility-Administered Medications:   •  acetaminophen (TYLENOL) tablet 500 mg, 500 mg, Oral, Nightly, Chance Wiggins MD  •  acetaminophen (TYLENOL) tablet 650 mg, 650 mg, Oral, Q6H PRN, Chance Wiggins MD  •  allopurinol (ZYLOPRIM) tablet 100 mg, 100 mg, Oral, Daily, Chance Wiggins MD  •  aluminum-magnesium hydroxide-simethicone (MAALOX MAX) 400-400-40 MG/5ML suspension 30 mL, 30 mL, Oral, Q4H PRN, Chance Wiggins MD  •  aspirin chewable tablet 81 mg, 81 mg, Oral, Daily, Chance Wiggins MD  •  atorvastatin (LIPITOR) tablet 10 mg, 10 mg, Oral, Nightly, Chance Wiggins MD  •  bisacodyl (DULCOLAX) suppository 10 mg, 10 mg, Rectal, Nightly PRN, Chance Wiggins MD  •  bumetanide (BUMEX) tablet 1 mg, 1 mg, Oral, BID, Perez Loya MD  •  carvedilol (COREG) tablet 12.5 mg, 12.5 mg, Oral, BID With Meals, Chance Wiggins MD  •  enoxaparin (LOVENOX) syringe 30 mg, 30 mg, Subcutaneous, Q24H, Rachael Chambers APRN  •  finasteride (PROSCAR) tablet 5 mg, 5 mg, Oral, Daily, Chance Wiggins MD  •  HYDROcodone-acetaminophen (NORCO) 7.5-325 MG per tablet 1 tablet, 1 tablet, Oral, Q4H PRN, Chance Wiggins MD  •  lactulose solution 20 g, 20 g, Oral, Once, Perez Loya MD  •  magnesium hydroxide (MILK OF MAGNESIA) suspension 2400 mg/10mL 10 mL, 10 mL, Oral, Daily PRN, Chance Wiggins MD  •  ondansetron (ZOFRAN) injection 4 mg, 4 mg, Intravenous, Q6H PRN, Chance Wiggins MD  •  saccharomyces boulardii (FLORASTOR) capsule 500 mg, 500 mg, Oral, BID, Chance Wiggins MD    Data:     Code Status:    There are no questions and answers to display.       No family  "history on file.    Social History     Socioeconomic History   • Marital status:      Spouse name: Not on file   • Number of children: Not on file   • Years of education: Not on file   • Highest education level: Not on file       Vitals:  Ht 180.3 cm (71\")   Wt (!) 138 kg (304 lb 11.2 oz)   BMI 42.50 kg/m²     T 97 pulse 83 respirations 20 blood pressure 137/61 sat 98% (room air)        I/O (24Hr):  No intake or output data in the 24 hours ending 08/06/21 1330    Labs and imaging:      Recent Results (from the past 12 hour(s))   Basic Metabolic Panel    Collection Time: 08/06/21  4:26 AM    Specimen: Blood   Result Value Ref Range    Glucose 145 (H) 65 - 99 mg/dL    BUN 77 (H) 8 - 23 mg/dL    Creatinine 2.47 (H) 0.76 - 1.27 mg/dL    Sodium 136 136 - 145 mmol/L    Potassium 3.7 3.5 - 5.2 mmol/L    Chloride 91 (L) 98 - 107 mmol/L    CO2 31.0 (H) 22.0 - 29.0 mmol/L    Calcium 9.3 8.2 - 9.6 mg/dL    eGFR Non African Amer 25 (L) >60 mL/min/1.73    BUN/Creatinine Ratio 31.2 (H) 7.0 - 25.0    Anion Gap 14.0 5.0 - 15.0 mmol/L   CBC Auto Differential    Collection Time: 08/06/21  4:26 AM    Specimen: Blood   Result Value Ref Range    WBC 12.75 (H) 3.40 - 10.80 10*3/mm3    RBC 3.98 (L) 4.14 - 5.80 10*6/mm3    Hemoglobin 12.4 (L) 13.0 - 17.7 g/dL    Hematocrit 37.0 (L) 37.5 - 51.0 %    MCV 93.0 79.0 - 97.0 fL    MCH 31.2 26.6 - 33.0 pg    MCHC 33.5 31.5 - 35.7 g/dL    RDW 15.2 12.3 - 15.4 %    RDW-SD 51.8 37.0 - 54.0 fl    MPV 9.1 6.0 - 12.0 fL    Platelets 265 140 - 450 10*3/mm3    Neutrophil % 71.0 42.7 - 76.0 %    Lymphocyte % 12.6 (L) 19.6 - 45.3 %    Monocyte % 10.9 5.0 - 12.0 %    Eosinophil % 2.4 0.3 - 6.2 %    Basophil % 0.7 0.0 - 1.5 %    Immature Grans % 2.4 (H) 0.0 - 0.5 %    Neutrophils, Absolute 9.06 (H) 1.70 - 7.00 10*3/mm3    Lymphocytes, Absolute 1.61 0.70 - 3.10 10*3/mm3    Monocytes, Absolute 1.39 (H) 0.10 - 0.90 10*3/mm3    Eosinophils, Absolute 0.30 0.00 - 0.40 10*3/mm3    Basophils, Absolute 0.09 " 0.00 - 0.20 10*3/mm3    Immature Grans, Absolute 0.30 (H) 0.00 - 0.05 10*3/mm3    nRBC 0.0 0.0 - 0.2 /100 WBC               Physical Examination:        Physical Exam  Vitals and nursing note reviewed.   Constitutional:       Appearance: He is well-developed.   HENT:      Head: Normocephalic and atraumatic.      Nose: Nose normal.   Eyes:      Pupils: Pupils are equal, round, and reactive to light.   Cardiovascular:      Rate and Rhythm: Normal rate and regular rhythm.      Heart sounds: Normal heart sounds.   Pulmonary:      Effort: Pulmonary effort is normal.      Breath sounds: Normal breath sounds.   Abdominal:      General: Bowel sounds are normal.      Palpations: Abdomen is soft.      Comments: obese   Musculoskeletal:         General: Normal range of motion.      Cervical back: Normal range of motion and neck supple.      Right lower leg: Edema present.      Left lower leg: Edema present.      Comments: Generalized weakness   Skin:     General: Skin is warm and dry.      Comments: Scaling and flaking of skin to BLE     Neurological:      Mental Status: He is alert and oriented to person, place, and time.      Deep Tendon Reflexes: Reflexes are normal and symmetric.   Psychiatric:         Behavior: Behavior normal.           Assessment:               Plan:        1. Venous stasis ulcer  2. Acute on chronic congestive heart failure  3. Chronic kidney disease stage lll  4. Chronic lymphedema  5. Candida intertrigo  6. BHP  7. Hypertension  8. Hyperlipidemia  9. Osteoarthritis bilateral knees  10. Anemia  11. PAF  12. Morbid obesity  13. Pseudomonas wound infection    Continue current treatment. Monitor counts. Increase activity, will re-consult PT to work with patient. Labs in AM, monitor renal function closely. Continue diuresis per nephrology, increased BUN/CRT; continuing bumex and DC zaroxolyn.  Continue to watch off antibiotics.  Continue to urge more therapy and compliance with leg elevation.  Will add  home tylenol back for night time.        Electronically signed by PRESTON Pickering on 8/6/2021 at 13:30 CDT

## 2021-08-07 LAB
ANION GAP SERPL CALCULATED.3IONS-SCNC: 13 MMOL/L (ref 5–15)
BUN SERPL-MCNC: 82 MG/DL (ref 8–23)
BUN/CREAT SERPL: 34.7 (ref 7–25)
CALCIUM SPEC-SCNC: 9.2 MG/DL (ref 8.2–9.6)
CHLORIDE SERPL-SCNC: 90 MMOL/L (ref 98–107)
CO2 SERPL-SCNC: 31 MMOL/L (ref 22–29)
CREAT SERPL-MCNC: 2.36 MG/DL (ref 0.76–1.27)
GFR SERPL CREATININE-BSD FRML MDRD: 26 ML/MIN/1.73
GLUCOSE SERPL-MCNC: 130 MG/DL (ref 65–99)
POTASSIUM SERPL-SCNC: 3.4 MMOL/L (ref 3.5–5.2)
SODIUM SERPL-SCNC: 134 MMOL/L (ref 136–145)

## 2021-08-07 PROCEDURE — 97110 THERAPEUTIC EXERCISES: CPT

## 2021-08-07 PROCEDURE — 25010000002 ENOXAPARIN PER 10 MG: Performed by: NURSE PRACTITIONER

## 2021-08-07 PROCEDURE — 80048 BASIC METABOLIC PNL TOTAL CA: CPT | Performed by: INTERNAL MEDICINE

## 2021-08-07 RX ORDER — POTASSIUM CHLORIDE 750 MG/1
40 CAPSULE, EXTENDED RELEASE ORAL EVERY 4 HOURS
Status: DISCONTINUED | OUTPATIENT
Start: 2021-08-07 | End: 2021-08-07 | Stop reason: HOSPADM

## 2021-08-07 NOTE — PROGRESS NOTES
BRAULIO Garland APRN        Internal Medicine Progress Note    8/7/2021   08:39 CDT    Name:  Nabeel Juarez  MRN:    [id number]     Acct:     [id number]   Room:  [address] [place] [address] Day: 0     Admit Date: 7/24/2021 11:14 PM  PCP: Provider, No Known    Subjective:     C/C: need for continued diuresis, wound care and antibiotic therapy    Interval History: Status:  stayed the same.  Resting in bed. No family at bedside.  Afebrile. Pt noncompliant with most therapy instructions. He is very unhappy today and wants to leave today.  He says his daughter is coming to pick him up.  I explained the benefits of him staying and working with therapy and he refuses.  He states he will call 911 if we do not let him leave.  RN notified of conversation. K+ 3.4         Review of Systems   Constitutional: Positive for malaise/fatigue. Negative for chills, decreased appetite, weight gain and weight loss.   HENT: Negative for congestion, ear discharge, hoarse voice and tinnitus.    Eyes: Negative for blurred vision, discharge, visual disturbance and visual halos.   Cardiovascular: Positive for dyspnea on exertion and leg swelling. Negative for chest pain, claudication, irregular heartbeat, orthopnea and paroxysmal nocturnal dyspnea.   Respiratory: Positive for shortness of breath. Negative for cough, sputum production and wheezing.    Endocrine: Negative for cold intolerance, heat intolerance and polyuria.   Hematologic/Lymphatic: Negative for adenopathy. Does not bruise/bleed easily.   Skin: Positive for poor wound healing. Negative for dry skin, itching and suspicious lesions.   Musculoskeletal: Negative for arthritis, back pain, falls, joint pain, muscle weakness and myalgias.   Gastrointestinal: Positive for constipation. Negative for abdominal pain, diarrhea, dysphagia and hematemesis.   Genitourinary: Negative for bladder incontinence, dysuria and frequency.   Neurological: Positive for  weakness. Negative for aphonia, disturbances in coordination and dizziness.   Psychiatric/Behavioral: Negative for altered mental status, depression, memory loss and substance abuse. The patient does not have insomnia and is not nervous/anxious.          Medications:     Allergies:   Allergies   Allergen Reactions   • Penicillins Swelling       Current Meds:   Current Facility-Administered Medications:   •  acetaminophen (TYLENOL) tablet 500 mg, 500 mg, Oral, Nightly, Chance Wiggins MD  •  acetaminophen (TYLENOL) tablet 650 mg, 650 mg, Oral, Q6H PRN, Chance Wiggins MD  •  allopurinol (ZYLOPRIM) tablet 100 mg, 100 mg, Oral, Daily, Chance Wiggins MD  •  aluminum-magnesium hydroxide-simethicone (MAALOX MAX) 400-400-40 MG/5ML suspension 30 mL, 30 mL, Oral, Q4H PRN, Chance Wiggins MD  •  aspirin chewable tablet 81 mg, 81 mg, Oral, Daily, Chance Wiggins MD  •  atorvastatin (LIPITOR) tablet 10 mg, 10 mg, Oral, Nightly, Chance Wiggins MD  •  bisacodyl (DULCOLAX) suppository 10 mg, 10 mg, Rectal, Nightly PRN, Chance Wiggins MD  •  bumetanide (BUMEX) tablet 1 mg, 1 mg, Oral, BID, Perez Loya MD  •  carvedilol (COREG) tablet 12.5 mg, 12.5 mg, Oral, BID With Meals, Chance Wiggins MD  •  enoxaparin (LOVENOX) syringe 30 mg, 30 mg, Subcutaneous, Q24H, Rachael Chambers APRN  •  finasteride (PROSCAR) tablet 5 mg, 5 mg, Oral, Daily, Chance Wiggins MD  •  HYDROcodone-acetaminophen (NORCO) 7.5-325 MG per tablet 1 tablet, 1 tablet, Oral, Q4H PRN, Chance Wiggins MD  •  lactulose solution 20 g, 20 g, Oral, Once, Perez Loya MD  •  magnesium hydroxide (MILK OF MAGNESIA) suspension 2400 mg/10mL 10 mL, 10 mL, Oral, Daily PRN, Chance Wiggins MD  •  ondansetron (ZOFRAN) injection 4 mg, 4 mg, Intravenous, Q6H PRN, Chance Wiggins MD  •  saccharomyces boulardii (FLORASTOR) capsule 500 mg, 500 mg, Oral, BID, Chance Wiggins MD    Data:     Code  "Status:    There are no questions and answers to display.       No family history on file.    Social History     Socioeconomic History   • Marital status:      Spouse name: Not on file   • Number of children: Not on file   • Years of education: Not on file   • Highest education level: Not on file       Vitals:  Ht 180.3 cm (71\")   Wt (!) 138 kg (304 lb 11.2 oz)   BMI 42.50 kg/m²     T 97.6 HR 84 RR 20 /58 sat 96% (room air)        I/O (24Hr):  No intake or output data in the 24 hours ending 08/07/21 0839    Labs and imaging:      Recent Results (from the past 12 hour(s))   Basic Metabolic Panel    Collection Time: 08/07/21  4:30 AM    Specimen: Blood   Result Value Ref Range    Glucose 130 (H) 65 - 99 mg/dL    BUN 82 (H) 8 - 23 mg/dL    Creatinine 2.36 (H) 0.76 - 1.27 mg/dL    Sodium 134 (L) 136 - 145 mmol/L    Potassium 3.4 (L) 3.5 - 5.2 mmol/L    Chloride 90 (L) 98 - 107 mmol/L    CO2 31.0 (H) 22.0 - 29.0 mmol/L    Calcium 9.2 8.2 - 9.6 mg/dL    eGFR Non African Amer 26 (L) >60 mL/min/1.73    BUN/Creatinine Ratio 34.7 (H) 7.0 - 25.0    Anion Gap 13.0 5.0 - 15.0 mmol/L               Physical Examination:        Physical Exam  Vitals and nursing note reviewed.   Constitutional:       Appearance: He is well-developed.   HENT:      Head: Normocephalic and atraumatic.      Nose: Nose normal.   Eyes:      Pupils: Pupils are equal, round, and reactive to light.   Cardiovascular:      Rate and Rhythm: Normal rate and regular rhythm.      Heart sounds: Normal heart sounds.   Pulmonary:      Effort: Pulmonary effort is normal.      Breath sounds: Normal breath sounds.   Abdominal:      General: Bowel sounds are normal.      Palpations: Abdomen is soft.      Comments: obese   Musculoskeletal:         General: Normal range of motion.      Cervical back: Normal range of motion and neck supple.      Right lower leg: Edema present.      Left lower leg: Edema present.      Comments: Generalized weakness   Skin:     " General: Skin is warm and dry.      Comments: Scaling and flaking of skin to BLE     Neurological:      Mental Status: He is alert and oriented to person, place, and time.      Deep Tendon Reflexes: Reflexes are normal and symmetric.   Psychiatric:         Behavior: Behavior normal.           Assessment:               Plan:        1. Venous stasis ulcer  2. Acute on chronic congestive heart failure  3. Chronic kidney disease stage lll  4. Chronic lymphedema  5. Candida intertrigo  6. BHP  7. Hypertension  8. Hyperlipidemia  9. Osteoarthritis bilateral knees  10. Anemia  11. PAF  12. Morbid obesity  13. Pseudomonas wound infection    Continue current treatment. Monitor counts. Increase activity, will re-consult PT to work with patient. Labs in AM, monitor renal function closely. Continue diuresis per nephrology, increased BUN/CRT; continuing bumex and DC zaroxolyn.  Continue to watch off antibiotics.  Continue to urge more therapy and compliance with leg elevation.  Will add home tylenol back for night time. Replace and Recheck K+ per protocol.       Electronically signed by PRESTON Sauceda on 8/7/2021 at 08:39 CDT

## 2021-08-08 NOTE — DISCHARGE SUMMARY
BRAULIO Garland APRN      Internal Medicine Discharge Summary    Patient ID: Nabeel Juarez  MRN: [id number]     Acct:  [id number]       Patient's PCP: Provider, No Known    Admit Date: 7/24/2021     Discharge Date: 8/7/2021      Admitting Physician: Chance Wiggins MD    Discharge Physician: PRESTON Sauceda     Active Discharge Diagnoses:    Venous stasis ulcer  Acute on chronic congestive heart failure  Chronic kidney disease stage lll  Chronic lymphedema  Candida intertrigo  BHP  Hypertension  Hyperlipidemia  Osteoarthritis bilateral knees  Anemia  PAF  Morbid obesity  Pseudomonas wound infection      Primary Problem  <principal problem not specified>      Hospital Problems    * No active hospital problems. *   No past medical history on file.    The patient was seen and examined on the day of discharge and this discharge summary is in conjunction with any daily progress note from day of discharge.    Code Status:    There are no questions and answers to display.       Hospital Course:     Nabeel Juarez is a  89 y.o.  male who presented with continued need for IV antibiotic therapy, diuresing and wound care to chronic venous stasis ulcerations to bilateral lower extremities with lymphedema after an acute care stay at [facility] in [facility].  Nabeel initially presented to [facility] via EMS on 7/22/2021 with significant bilateral lower extremity edema, redness and drainage with a foul odor after failing outpatient treatment with doxycycline along with home health services.  He complained of increased pain and swelling to bilateral lower extremities limiting his ability to ambulate.  During his hospital course at [facility] he was diuresed with Lasix and received vancomycin and cefepime with improvement in drainage to bilateral lower extremities.  Nabeel was admitted to our facility for  continuation of  IV antibiotic regimen, wound care assessment for further treatment recommendations of bilateral lower extremities and physical rehabilitation. During his admission he was noncompliant with PT/OT and refused to participate with any instructions.  He was folowed by nephrology due to history of CKD and Lasix drip.  He was able to wean off of the lasix drip and transitioned to PO Bumex.  His renal functions did improve, but Mr. Juarez made it very clear he would never have dialysis treatments in the event they were needed. Attempts were made to persuade him to participate with therapy and treatment regimens, however he repeatedly refused. Discharge plans were made to send Mr. Juarez home Monday 8/9/2021.  His daughter presented to the facility yesterday afternoon and was addiment that he be discharged since he was refusing therapy and she could take care of him at home.  Mr Juarez was discharged home on 8/7/2021 in stable condition with orders to follow up with his primary care physician Monday.      Physical Exam  Vitals and nursing note reviewed.   Constitutional:       Appearance: He is well-developed.   HENT:      Head: Normocephalic and atraumatic.      Nose: Nose normal.   Eyes:      Pupils: Pupils are equal, round, and reactive to light.   Cardiovascular:      Rate and Rhythm: Normal rate and regular rhythm.      Heart sounds: Normal heart sounds.   Pulmonary:      Effort: Pulmonary effort is normal.      Breath sounds: Normal breath sounds.   Abdominal:      General: Bowel sounds are normal.      Palpations: Abdomen is soft.      Comments: obese   Musculoskeletal:         General: Normal range of motion.      Cervical back: Normal range of motion and neck supple.      Right lower leg: Edema present.      Left lower leg: Edema present.      Comments: Generalized weakness   Skin:     General: Skin is warm and dry.      Comments: Scaling and flaking of skin to BLE     Neurological:      Mental  Status: He is alert and oriented to person, place, and time.      Deep Tendon Reflexes: Reflexes are normal and symmetric.   Psychiatric:         Behavior: Behavior normal.      Consults:      Dr. Loya - Nephrology    Disposition: home    Discharged Condition: Stable    Follow Up: Provider, No Known  [facility]  [place] [id number]  [phone]        Diet: No diet orders on file    Discharge Medications:   Continue Home Meds  Add Bumex 1 mg BID      Discharge Medications      Patient Not Prescribed Medications Upon Discharge         Time Spent on discharge is  32 minutes in patient examination, evaluation, patient/family counseling as well as medication reconciliation, prescriptions for required medications, discharge plan and follow up.     Electronically signed by PRESTON Sauceda on 8/8/2021 at 10:52 CDT     I have discussed the care of Nabeel Juarez, including pertinent history and exam findings, with the nurse practitioner.    I have seen and examined the patient and the key elements of all parts of the encounter have been performed by me.  I agree with the assessment, plan and orders as documented by PRESTON Martinez, after I modified the exam findings and the plan of treatments and the final version is my approved version of the assessment.        Electronically signed by Chance Wiggins MD on 8/9/2021 at 16:23 CDT